# Patient Record
Sex: FEMALE | URBAN - METROPOLITAN AREA
[De-identification: names, ages, dates, MRNs, and addresses within clinical notes are randomized per-mention and may not be internally consistent; named-entity substitution may affect disease eponyms.]

---

## 2017-08-07 ENCOUNTER — INPATIENT (INPATIENT)
Facility: HOSPITAL | Age: 82
LOS: 3 days | Discharge: HOME | End: 2017-08-11
Attending: HOSPITALIST | Admitting: HOSPITALIST
Payer: MEDICARE

## 2017-08-07 VITALS
HEART RATE: 76 BPM | DIASTOLIC BLOOD PRESSURE: 68 MMHG | OXYGEN SATURATION: 100 % | RESPIRATION RATE: 20 BRPM | SYSTOLIC BLOOD PRESSURE: 164 MMHG | TEMPERATURE: 99 F | WEIGHT: 229.28 LBS

## 2017-08-07 DIAGNOSIS — I10 ESSENTIAL (PRIMARY) HYPERTENSION: ICD-10-CM

## 2017-08-07 DIAGNOSIS — R06.02 SHORTNESS OF BREATH: ICD-10-CM

## 2017-08-07 DIAGNOSIS — J47.1 BRONCHIECTASIS WITH (ACUTE) EXACERBATION: ICD-10-CM

## 2017-08-07 LAB
ALBUMIN SERPL ELPH-MCNC: 3.7 G/DL — SIGNIFICANT CHANGE UP (ref 3.3–5)
ALP SERPL-CCNC: 120 U/L — SIGNIFICANT CHANGE UP (ref 40–120)
ALT FLD-CCNC: 23 U/L — SIGNIFICANT CHANGE UP (ref 12–78)
ANION GAP SERPL CALC-SCNC: 10 MMOL/L — SIGNIFICANT CHANGE UP (ref 5–17)
ANION GAP SERPL CALC-SCNC: 8 MMOL/L — SIGNIFICANT CHANGE UP (ref 5–17)
APTT BLD: 29.3 SEC — SIGNIFICANT CHANGE UP (ref 27.5–37.4)
AST SERPL-CCNC: 15 U/L — SIGNIFICANT CHANGE UP (ref 15–37)
BASOPHILS # BLD AUTO: 0.1 K/UL — SIGNIFICANT CHANGE UP (ref 0–0.2)
BASOPHILS NFR BLD AUTO: 1.2 % — SIGNIFICANT CHANGE UP (ref 0–2)
BILIRUB SERPL-MCNC: 0.3 MG/DL — SIGNIFICANT CHANGE UP (ref 0.2–1.2)
BUN SERPL-MCNC: 32 MG/DL — HIGH (ref 7–23)
BUN SERPL-MCNC: 34 MG/DL — HIGH (ref 7–23)
CALCIUM SERPL-MCNC: 9.3 MG/DL — SIGNIFICANT CHANGE UP (ref 8.5–10.1)
CALCIUM SERPL-MCNC: 9.9 MG/DL — SIGNIFICANT CHANGE UP (ref 8.5–10.1)
CHLORIDE SERPL-SCNC: 102 MMOL/L — SIGNIFICANT CHANGE UP (ref 96–108)
CHLORIDE SERPL-SCNC: 105 MMOL/L — SIGNIFICANT CHANGE UP (ref 96–108)
CK MB BLD-MCNC: 3.2 % — SIGNIFICANT CHANGE UP (ref 0–3.5)
CK MB CFR SERPL CALC: 1 NG/ML — SIGNIFICANT CHANGE UP (ref 0.5–3.6)
CK SERPL-CCNC: 31 U/L — SIGNIFICANT CHANGE UP (ref 26–192)
CO2 SERPL-SCNC: 26 MMOL/L — SIGNIFICANT CHANGE UP (ref 22–31)
CO2 SERPL-SCNC: 28 MMOL/L — SIGNIFICANT CHANGE UP (ref 22–31)
CREAT SERPL-MCNC: 1.18 MG/DL — SIGNIFICANT CHANGE UP (ref 0.5–1.3)
CREAT SERPL-MCNC: 1.21 MG/DL — SIGNIFICANT CHANGE UP (ref 0.5–1.3)
EOSINOPHIL # BLD AUTO: 0.1 K/UL — SIGNIFICANT CHANGE UP (ref 0–0.5)
EOSINOPHIL NFR BLD AUTO: 2 % — SIGNIFICANT CHANGE UP (ref 0–6)
GLUCOSE SERPL-MCNC: 118 MG/DL — HIGH (ref 70–99)
GLUCOSE SERPL-MCNC: 187 MG/DL — HIGH (ref 70–99)
HCT VFR BLD CALC: 38.2 % — SIGNIFICANT CHANGE UP (ref 34.5–45)
HGB BLD-MCNC: 12 G/DL — SIGNIFICANT CHANGE UP (ref 11.5–15.5)
INR BLD: 1.09 RATIO — SIGNIFICANT CHANGE UP (ref 0.88–1.16)
LACTATE SERPL-SCNC: 0.9 MMOL/L — SIGNIFICANT CHANGE UP (ref 0.7–2)
LYMPHOCYTES # BLD AUTO: 1.7 K/UL — SIGNIFICANT CHANGE UP (ref 1–3.3)
LYMPHOCYTES # BLD AUTO: 27.3 % — SIGNIFICANT CHANGE UP (ref 13–44)
MCHC RBC-ENTMCNC: 27.7 PG — SIGNIFICANT CHANGE UP (ref 27–34)
MCHC RBC-ENTMCNC: 31.4 GM/DL — LOW (ref 32–36)
MCV RBC AUTO: 88 FL — SIGNIFICANT CHANGE UP (ref 80–100)
MONOCYTES # BLD AUTO: 0.8 K/UL — SIGNIFICANT CHANGE UP (ref 0–0.9)
MONOCYTES NFR BLD AUTO: 12.9 % — SIGNIFICANT CHANGE UP (ref 2–14)
NEUTROPHILS # BLD AUTO: 3.5 K/UL — SIGNIFICANT CHANGE UP (ref 1.8–7.4)
NEUTROPHILS NFR BLD AUTO: 56.6 % — SIGNIFICANT CHANGE UP (ref 43–77)
NT-PROBNP SERPL-SCNC: 111 PG/ML — SIGNIFICANT CHANGE UP (ref 0–450)
PLATELET # BLD AUTO: 224 K/UL — SIGNIFICANT CHANGE UP (ref 150–400)
POTASSIUM SERPL-MCNC: 2.9 MMOL/L — CRITICAL LOW (ref 3.5–5.3)
POTASSIUM SERPL-MCNC: 3.3 MMOL/L — LOW (ref 3.5–5.3)
POTASSIUM SERPL-SCNC: 2.9 MMOL/L — CRITICAL LOW (ref 3.5–5.3)
POTASSIUM SERPL-SCNC: 3.3 MMOL/L — LOW (ref 3.5–5.3)
PROT SERPL-MCNC: 7.9 GM/DL — SIGNIFICANT CHANGE UP (ref 6–8.3)
PROTHROM AB SERPL-ACNC: 11.9 SEC — SIGNIFICANT CHANGE UP (ref 9.8–12.7)
RBC # BLD: 4.34 M/UL — SIGNIFICANT CHANGE UP (ref 3.8–5.2)
RBC # FLD: 13.3 % — SIGNIFICANT CHANGE UP (ref 11–15)
SODIUM SERPL-SCNC: 138 MMOL/L — SIGNIFICANT CHANGE UP (ref 135–145)
SODIUM SERPL-SCNC: 141 MMOL/L — SIGNIFICANT CHANGE UP (ref 135–145)
TROPONIN I SERPL-MCNC: <.015 NG/ML — SIGNIFICANT CHANGE UP (ref 0.01–0.04)
WBC # BLD: 6.1 K/UL — SIGNIFICANT CHANGE UP (ref 3.8–10.5)
WBC # FLD AUTO: 6.1 K/UL — SIGNIFICANT CHANGE UP (ref 3.8–10.5)

## 2017-08-07 PROCEDURE — 71250 CT THORAX DX C-: CPT | Mod: 26

## 2017-08-07 PROCEDURE — 99285 EMERGENCY DEPT VISIT HI MDM: CPT

## 2017-08-07 PROCEDURE — 93010 ELECTROCARDIOGRAM REPORT: CPT

## 2017-08-07 PROCEDURE — 99223 1ST HOSP IP/OBS HIGH 75: CPT

## 2017-08-07 PROCEDURE — 71010: CPT | Mod: 26

## 2017-08-07 RX ORDER — LORATADINE 10 MG/1
10 TABLET ORAL DAILY
Qty: 0 | Refills: 0 | Status: DISCONTINUED | OUTPATIENT
Start: 2017-08-07 | End: 2017-08-11

## 2017-08-07 RX ORDER — IPRATROPIUM/ALBUTEROL SULFATE 18-103MCG
3 AEROSOL WITH ADAPTER (GRAM) INHALATION
Qty: 0 | Refills: 0 | Status: COMPLETED | OUTPATIENT
Start: 2017-08-07 | End: 2017-08-07

## 2017-08-07 RX ORDER — SODIUM CHLORIDE 9 MG/ML
1000 INJECTION INTRAMUSCULAR; INTRAVENOUS; SUBCUTANEOUS ONCE
Qty: 0 | Refills: 0 | Status: COMPLETED | OUTPATIENT
Start: 2017-08-07 | End: 2017-08-07

## 2017-08-07 RX ORDER — AMLODIPINE BESYLATE 2.5 MG/1
5 TABLET ORAL DAILY
Qty: 0 | Refills: 0 | Status: DISCONTINUED | OUTPATIENT
Start: 2017-08-07 | End: 2017-08-11

## 2017-08-07 RX ORDER — HYDROCHLOROTHIAZIDE 25 MG
25 TABLET ORAL DAILY
Qty: 0 | Refills: 0 | Status: DISCONTINUED | OUTPATIENT
Start: 2017-08-07 | End: 2017-08-09

## 2017-08-07 RX ORDER — SODIUM CHLORIDE 9 MG/ML
3 INJECTION INTRAMUSCULAR; INTRAVENOUS; SUBCUTANEOUS ONCE
Qty: 0 | Refills: 0 | Status: COMPLETED | OUTPATIENT
Start: 2017-08-07 | End: 2017-08-07

## 2017-08-07 RX ORDER — HEPARIN SODIUM 5000 [USP'U]/ML
5000 INJECTION INTRAVENOUS; SUBCUTANEOUS EVERY 8 HOURS
Qty: 0 | Refills: 0 | Status: DISCONTINUED | OUTPATIENT
Start: 2017-08-07 | End: 2017-08-11

## 2017-08-07 RX ORDER — PANTOPRAZOLE SODIUM 20 MG/1
40 TABLET, DELAYED RELEASE ORAL
Qty: 0 | Refills: 0 | Status: DISCONTINUED | OUTPATIENT
Start: 2017-08-07 | End: 2017-08-11

## 2017-08-07 RX ORDER — POTASSIUM CHLORIDE 20 MEQ
10 PACKET (EA) ORAL ONCE
Qty: 0 | Refills: 0 | Status: COMPLETED | OUTPATIENT
Start: 2017-08-07 | End: 2017-08-07

## 2017-08-07 RX ORDER — IPRATROPIUM/ALBUTEROL SULFATE 18-103MCG
3 AEROSOL WITH ADAPTER (GRAM) INHALATION EVERY 6 HOURS
Qty: 0 | Refills: 0 | Status: DISCONTINUED | OUTPATIENT
Start: 2017-08-07 | End: 2017-08-11

## 2017-08-07 RX ORDER — POTASSIUM CHLORIDE 20 MEQ
40 PACKET (EA) ORAL ONCE
Qty: 0 | Refills: 0 | Status: COMPLETED | OUTPATIENT
Start: 2017-08-07 | End: 2017-08-07

## 2017-08-07 RX ORDER — SERTRALINE 25 MG/1
25 TABLET, FILM COATED ORAL DAILY
Qty: 0 | Refills: 0 | Status: DISCONTINUED | OUTPATIENT
Start: 2017-08-07 | End: 2017-08-11

## 2017-08-07 RX ORDER — SIMVASTATIN 20 MG/1
20 TABLET, FILM COATED ORAL AT BEDTIME
Qty: 0 | Refills: 0 | Status: DISCONTINUED | OUTPATIENT
Start: 2017-08-07 | End: 2017-08-11

## 2017-08-07 RX ADMIN — Medication 3 MILLILITER(S): at 23:44

## 2017-08-07 RX ADMIN — SODIUM CHLORIDE 1000 MILLILITER(S): 9 INJECTION INTRAMUSCULAR; INTRAVENOUS; SUBCUTANEOUS at 07:12

## 2017-08-07 RX ADMIN — Medication 3 MILLILITER(S): at 06:29

## 2017-08-07 RX ADMIN — HEPARIN SODIUM 5000 UNIT(S): 5000 INJECTION INTRAVENOUS; SUBCUTANEOUS at 22:30

## 2017-08-07 RX ADMIN — Medication 100 MILLIEQUIVALENT(S): at 11:17

## 2017-08-07 RX ADMIN — Medication 40 MILLIEQUIVALENT(S): at 12:32

## 2017-08-07 RX ADMIN — SIMVASTATIN 20 MILLIGRAM(S): 20 TABLET, FILM COATED ORAL at 22:30

## 2017-08-07 RX ADMIN — Medication 3 MILLILITER(S): at 07:48

## 2017-08-07 RX ADMIN — Medication 3 MILLILITER(S): at 07:05

## 2017-08-07 RX ADMIN — Medication 40 MILLIGRAM(S): at 22:30

## 2017-08-07 RX ADMIN — SODIUM CHLORIDE 3 MILLILITER(S): 9 INJECTION INTRAMUSCULAR; INTRAVENOUS; SUBCUTANEOUS at 06:25

## 2017-08-07 RX ADMIN — Medication 125 MILLIGRAM(S): at 07:07

## 2017-08-07 NOTE — ED ADULT NURSE REASSESSMENT NOTE - NS ED NURSE REASSESS COMMENT FT1
Pt reports improvement in respiratory status. No apparent respiratory distress noted at this time. Family at bedside awaiting transfer to bed assigned

## 2017-08-07 NOTE — ED ADULT NURSE NOTE - OBJECTIVE STATEMENT
cough for 2 days with yellowish Phlegm,denies fever,no shortness of breath,denies chest nor back pain

## 2017-08-07 NOTE — ED PROVIDER NOTE - OBJECTIVE STATEMENT
89yoF; with pmh signif for HTN; now p/w cough and sob x3-4 days.  cough--yellow productive cough. associated with sob. denies cp/palp. denies f/c/s. denies sick contacts. denies travel. denies abd pain. denies n/v/d. denies dysuria, hematuria, frequency, urgency.

## 2017-08-07 NOTE — H&P ADULT - NSHPPHYSICALEXAM_GEN_ALL_CORE
GENERAL: NAD well-developed  HEAD:  Atraumatic, Normocephalic  EYES: EOMI, PERRLA, conjunctiva and sclera clear  ENMT: No tonsillar erythema, exudates, or enlargement; Moist mucous membranes, Good dentition, No lesions  NECK: Supple, No JVD, Normal thyroid  NERVOUS SYSTEM:  Alert & Oriented X3, Good concentration; Motor Strength 5/5 B/L upper and lower extremities; DTRs 2+ intact and symmetric  CHEST/LUNG: mild expiratory wheezing   HEART: Regular rate and rhythm; No murmurs, rubs, or gallops  ABDOMEN: Soft, Nontender, Nondistended; Bowel sounds present  EXTREMITIES:  2+ Peripheral Pulses, No clubbing, cyanosis, or edema  LYMPH: No lymphadenopathy   SKIN: No rashes or lesions

## 2017-08-07 NOTE — ED PROVIDER NOTE - PHYSICAL EXAMINATION
General:     NAD, well-nourished, well-appearing  Head:     NC/AT, EOMI, oral mucosa moist  Neck:     trachea midline  Lungs:  bibasilar crackles diffusely, exp wheezing, no retractions  CVS:     S1S2, RRR, no m/g/r  Abd:     +BS, s/nt/nd, no organomegaly  Ext:    2+ radial and pedal pulses, no c/c/e  Neuro: AAOx3, no sensory/motor deficits

## 2017-08-07 NOTE — H&P ADULT - NSHPLABSRESULTS_GEN_ALL_CORE
< from: Xray Chest 1 View AP/PA. (08.07.17 @ 06:32) >    INTERPRETATION:  AP erect chest on August 7 at 6:26 AM. Patient has   respiratory distress and cough.    Heart is normal for projection.    Spinal catheter again noted ending in the mid lower thoracic region.    The lung fields and pleural surfaces are unremarkable.    The chest is similar to December 14, 2014.    IMPRESSION: Spinal catheter. No infiltrate.          6.1   )-----------( 224      ( 07 Aug 2017 06:22 )             38.2   08-07    141  |  105  |  32<H>  ----------------------------<  187<H>  2.9<LL>   |  26  |  1.18    Ca    9.3      07 Aug 2017 09:24    TPro  7.9  /  Alb  3.7  /  TBili  0.3  /  DBili  x   /  AST  15  /  ALT  23  /  AlkPhos  120  08-07    < from: CT Chest No Cont (08.07.17 @ 13:18) >    XAM:  CT CHEST                            PROCEDURE DATE:  08/07/2017          INTERPRETATION:  CLINICAL INFORMATION: Coughing, shortness of breath    COMPARISON: No prior CT studies are available for comparison.    PROCEDURE:     Serial axial sections through the chest were obtained without   administration of nonionic intravenous contrast. Sagittal, MIP and   coronal reformats were then generated from the axial images.    FINDINGS:     LUNG AND LARGE AIRWAYS: The tracheobronchial tree is patent. There is   slightly increased peripheral reticular opacities as well as mild   bronchiectasis. There are a few scattered peripheral ground glass   opacities. There are a few scattered granulomas as well as additional   sub-4 mm nodules which are nonspecific.  PLEURA: No pleural effusion.  VESSELS: Marked atherosclerotic changes of the aorta and branching vessels  HEART: Normal size. No pericardial effusion.  MEDIASTINUM AND MARY: Within normal limits.  CHEST WALL AND LOWER NECK: The left thyroid lobe is enlarged and   heterogeneous and extends into the upper mediastinum.    UPPER ABDOMEN: Mild nonspecific left adrenal gland thickening.    BONES: Degenerative changes of the spine. Epidural catheter is seen   terminating level of T7.     IMPRESSION:     No focal consolidation or pleural effusion. Chronic lung changes with   mild bronchiectasis.     Scattered sub-4mm nodules are nonspecific.    Additional findings as above.

## 2017-08-07 NOTE — H&P ADULT - NSHPREVIEWOFSYSTEMS_GEN_ALL_CORE
CONSTITUTIONAL: No fever, weight loss, or fatigue  EYES: No eye pain, visual disturbances, or discharge  ENMT:  No difficulty hearing, tinnitus, vertigo; No sinus or throat pain  NECK: No pain or stiffness  RESPIRATORY: pos  cough, no wheezing, chills or hemoptysis; POSITIVE  shortness of breath  CARDIOVASCULAR: No chest pain, palpitations, dizziness, or leg swelling  GASTROINTESTINAL: No abdominal or epigastric pain. No nausea, vomiting, or hematemesis; No diarrhea or constipation. No melena or hematochezia.  GENITOURINARY: No dysuria, frequency, hematuria, or incontinence  NEUROLOGICAL: No headaches, memory loss, loss of strength, numbness, or tremors  SKIN: No itching, burning, rashes, or lesions   LYMPH NODES: No enlarged glands  ENDOCRINE: No heat or cold intolerance; No hair loss  MUSCULOSKELETAL: No joint pain or swelling; No muscle, back, or extremity pain  PSYCHIATRIC: No depression, anxiety, mood swings, or difficulty sleeping  HEME/LYMPH: No easy bruising, or bleeding gums  ALLERGY AND IMMUNOLOGIC: No hives or eczema

## 2017-08-08 LAB
ANION GAP SERPL CALC-SCNC: 10 MMOL/L — SIGNIFICANT CHANGE UP (ref 5–17)
BUN SERPL-MCNC: 26 MG/DL — HIGH (ref 7–23)
CALCIUM SERPL-MCNC: 10.1 MG/DL — SIGNIFICANT CHANGE UP (ref 8.5–10.1)
CHLORIDE SERPL-SCNC: 104 MMOL/L — SIGNIFICANT CHANGE UP (ref 96–108)
CO2 SERPL-SCNC: 25 MMOL/L — SIGNIFICANT CHANGE UP (ref 22–31)
CREAT SERPL-MCNC: 1.35 MG/DL — HIGH (ref 0.5–1.3)
GLUCOSE SERPL-MCNC: 194 MG/DL — HIGH (ref 70–99)
HCT VFR BLD CALC: 37.3 % — SIGNIFICANT CHANGE UP (ref 34.5–45)
HGB BLD-MCNC: 12.1 G/DL — SIGNIFICANT CHANGE UP (ref 11.5–15.5)
MCHC RBC-ENTMCNC: 28.9 PG — SIGNIFICANT CHANGE UP (ref 27–34)
MCHC RBC-ENTMCNC: 32.5 GM/DL — SIGNIFICANT CHANGE UP (ref 32–36)
MCV RBC AUTO: 88.7 FL — SIGNIFICANT CHANGE UP (ref 80–100)
PLATELET # BLD AUTO: 213 K/UL — SIGNIFICANT CHANGE UP (ref 150–400)
POTASSIUM SERPL-MCNC: 3.7 MMOL/L — SIGNIFICANT CHANGE UP (ref 3.5–5.3)
POTASSIUM SERPL-SCNC: 3.7 MMOL/L — SIGNIFICANT CHANGE UP (ref 3.5–5.3)
RBC # BLD: 4.21 M/UL — SIGNIFICANT CHANGE UP (ref 3.8–5.2)
RBC # FLD: 13.5 % — SIGNIFICANT CHANGE UP (ref 11–15)
SODIUM SERPL-SCNC: 139 MMOL/L — SIGNIFICANT CHANGE UP (ref 135–145)
WBC # BLD: 10.8 K/UL — HIGH (ref 3.8–10.5)
WBC # FLD AUTO: 10.8 K/UL — HIGH (ref 3.8–10.5)

## 2017-08-08 PROCEDURE — 99233 SBSQ HOSP IP/OBS HIGH 50: CPT

## 2017-08-08 RX ORDER — DOCUSATE SODIUM 100 MG
100 CAPSULE ORAL DAILY
Qty: 0 | Refills: 0 | Status: DISCONTINUED | OUTPATIENT
Start: 2017-08-08 | End: 2017-08-11

## 2017-08-08 RX ORDER — AZITHROMYCIN 500 MG/1
500 TABLET, FILM COATED ORAL DAILY
Qty: 0 | Refills: 0 | Status: DISCONTINUED | OUTPATIENT
Start: 2017-08-08 | End: 2017-08-11

## 2017-08-08 RX ADMIN — Medication 3 MILLILITER(S): at 11:31

## 2017-08-08 RX ADMIN — Medication 3 MILLILITER(S): at 23:52

## 2017-08-08 RX ADMIN — Medication 100 MILLIGRAM(S): at 15:23

## 2017-08-08 RX ADMIN — HEPARIN SODIUM 5000 UNIT(S): 5000 INJECTION INTRAVENOUS; SUBCUTANEOUS at 05:36

## 2017-08-08 RX ADMIN — SERTRALINE 25 MILLIGRAM(S): 25 TABLET, FILM COATED ORAL at 11:53

## 2017-08-08 RX ADMIN — SIMVASTATIN 20 MILLIGRAM(S): 20 TABLET, FILM COATED ORAL at 22:42

## 2017-08-08 RX ADMIN — Medication 3 MILLILITER(S): at 18:38

## 2017-08-08 RX ADMIN — HEPARIN SODIUM 5000 UNIT(S): 5000 INJECTION INTRAVENOUS; SUBCUTANEOUS at 13:34

## 2017-08-08 RX ADMIN — AMLODIPINE BESYLATE 5 MILLIGRAM(S): 2.5 TABLET ORAL at 05:35

## 2017-08-08 RX ADMIN — Medication 40 MILLIGRAM(S): at 05:35

## 2017-08-08 RX ADMIN — Medication 40 MILLIGRAM(S): at 22:45

## 2017-08-08 RX ADMIN — Medication 3 MILLILITER(S): at 05:42

## 2017-08-08 RX ADMIN — PANTOPRAZOLE SODIUM 40 MILLIGRAM(S): 20 TABLET, DELAYED RELEASE ORAL at 07:39

## 2017-08-08 RX ADMIN — Medication 40 MILLIGRAM(S): at 13:34

## 2017-08-08 RX ADMIN — Medication 25 MILLIGRAM(S): at 05:36

## 2017-08-08 RX ADMIN — LORATADINE 10 MILLIGRAM(S): 10 TABLET ORAL at 11:53

## 2017-08-08 RX ADMIN — HEPARIN SODIUM 5000 UNIT(S): 5000 INJECTION INTRAVENOUS; SUBCUTANEOUS at 22:49

## 2017-08-08 NOTE — PROGRESS NOTE ADULT - SUBJECTIVE AND OBJECTIVE BOX
Patient is a 89y old  Female who presents with a chief complaint of short of breath (07 Aug 2017 17:08)      INTERVAL HPI/OVERNIGHT EVENTS: New admission overnight     MEDICATIONS  (STANDING):  heparin  Injectable 5000 Unit(s) SubCutaneous every 8 hours  sertraline 25 milliGRAM(s) Oral daily  simvastatin 20 milliGRAM(s) Oral at bedtime  amLODIPine   Tablet 5 milliGRAM(s) Oral daily  hydrochlorothiazide 25 milliGRAM(s) Oral daily  pantoprazole    Tablet 40 milliGRAM(s) Oral before breakfast  ALBUTerol/ipratropium for Nebulization 3 milliLiter(s) Nebulizer every 6 hours  methylPREDNISolone sodium succinate Injectable 40 milliGRAM(s) IV Push every 8 hours  loratadine 10 milliGRAM(s) Oral daily  docusate sodium 100 milliGRAM(s) Oral daily  azithromycin   Tablet 500 milliGRAM(s) Oral daily    MEDICATIONS  (PRN):  benzonatate 100 milliGRAM(s) Oral three times a day PRN Cough      Allergies    No Known Allergies    Intolerances        REVIEW OF SYSTEMS:  CONSTITUTIONAL: No fever, weight loss, or fatigue  EYES: No eye pain, visual disturbances, or discharge  ENMT:  No difficulty hearing, tinnitus, vertigo; No sinus or throat pain  NECK: No pain or stiffness  BREASTS: No pain, masses, or nipple discharge  RESPIRATORY: wheezing cough   CV: No chest pain, palpitations, dizziness, or leg swelling  GASTROINTESTINAL: No abdominal or epigastric pain. No nausea, vomiting, or hematemesis; No diarrhea or constipation. No melena or hematochezia.  GENITOURINARY: No dysuria, frequency, hematuria, or incontinence  NEUROLOGICAL: No headaches, memory loss, loss of strength, numbness, or tremors  SKIN: No itching, burning, rashes, or lesions   LYMPH NODES: No enlarged glands  ENDOCRINE: No heat or cold intolerance; No hair loss  MUSCULOSKELETAL: No joint pain or swelling; No muscle, back, or extremity pain  PSYCHIATRIC: No depression, anxiety, mood swings, or difficulty sleeping  HEME/LYMPH: No easy bruising, or bleeding gums  ALLERGY AND IMMUNOLOGIC: No hives or eczema    Vital Signs Last 24 Hrs  T(C): 36.5 (08 Aug 2017 10:45), Max: 37.4 (08 Aug 2017 05:33)  T(F): 97.7 (08 Aug 2017 10:45), Max: 99.4 (08 Aug 2017 05:33)  HR: 75 (08 Aug 2017 12:07) (75 - 96)  BP: 144/75 (08 Aug 2017 10:45) (144/75 - 153/76)  BP(mean): --  RR: 16 (08 Aug 2017 10:45) (16 - 17)  SpO2: 96% (08 Aug 2017 12:07) (93% - 97%)    PHYSICAL EXAM:  GENERAL: NAD, well-groomed, well-developed  HEAD:  Atraumatic, Normocephalic  EYES: EOMI, PERRLA, conjunctiva and sclera clear  ENMT: No tonsillar erythema, exudates, or enlargement; Moist mucous membranes, Good dentition, No lesions  NECK: Supple, No JVD, Normal thyroid  NERVOUS SYSTEM:  Alert & Oriented X3, Good concentration; Motor Strength 5/5 B/L upper and lower extremities; DTRs 2+ intact and symmetric  CHEST/LUNG: lright sided bronchial wheeze   HEART: Regular rate and rhythm; No murmurs, rubs, or gallops  ABDOMEN: Soft, Nontender, Nondistended; Bowel sounds present  EXTREMITIES:  2+ Peripheral Pulses, No clubbing, cyanosis, or edema  LYMPH: No lymphadenopathy noted  SKIN: No rashes or lesions    LABS:                        12.1   10.8  )-----------( 213      ( 08 Aug 2017 09:55 )             37.3     08-08    139  |  104  |  26<H>  ----------------------------<  194<H>  3.7   |  25  |  1.35<H>    Ca    10.1      08 Aug 2017 09:55    TPro  7.9  /  Alb  3.7  /  TBili  0.3  /  DBili  x   /  AST  15  /  ALT  23  /  AlkPhos  120  08-07    PT/INR - ( 07 Aug 2017 06:22 )   PT: 11.9 sec;   INR: 1.09 ratio         PTT - ( 07 Aug 2017 06:22 )  PTT:29.3 sec    CAPILLARY BLOOD GLUCOSE          RADIOLOGY & ADDITIONAL TESTS:    Imaging Personally Reviewed:  [X ] YES  [ ] NO    Consultant(s) Notes Reviewed:  [X ] YES  [ ] NO    Care Discussed with Consultants/Other Providers [X ] YES  [ ] NO

## 2017-08-09 DIAGNOSIS — N17.9 ACUTE KIDNEY FAILURE, UNSPECIFIED: ICD-10-CM

## 2017-08-09 LAB
ALBUMIN SERPL ELPH-MCNC: 3.3 G/DL — SIGNIFICANT CHANGE UP (ref 3.3–5)
ALP SERPL-CCNC: 103 U/L — SIGNIFICANT CHANGE UP (ref 40–120)
ALT FLD-CCNC: 24 U/L — SIGNIFICANT CHANGE UP (ref 12–78)
ANION GAP SERPL CALC-SCNC: 12 MMOL/L — SIGNIFICANT CHANGE UP (ref 5–17)
AST SERPL-CCNC: 15 U/L — SIGNIFICANT CHANGE UP (ref 15–37)
BILIRUB SERPL-MCNC: 0.3 MG/DL — SIGNIFICANT CHANGE UP (ref 0.2–1.2)
BUN SERPL-MCNC: 38 MG/DL — HIGH (ref 7–23)
CALCIUM SERPL-MCNC: 10 MG/DL — SIGNIFICANT CHANGE UP (ref 8.5–10.1)
CHLORIDE SERPL-SCNC: 102 MMOL/L — SIGNIFICANT CHANGE UP (ref 96–108)
CO2 SERPL-SCNC: 25 MMOL/L — SIGNIFICANT CHANGE UP (ref 22–31)
CREAT SERPL-MCNC: 1.64 MG/DL — HIGH (ref 0.5–1.3)
GLUCOSE SERPL-MCNC: 145 MG/DL — HIGH (ref 70–99)
HCT VFR BLD CALC: 36.2 % — SIGNIFICANT CHANGE UP (ref 34.5–45)
HGB BLD-MCNC: 11.7 G/DL — SIGNIFICANT CHANGE UP (ref 11.5–15.5)
MAGNESIUM SERPL-MCNC: 1.7 MG/DL — SIGNIFICANT CHANGE UP (ref 1.6–2.6)
MCHC RBC-ENTMCNC: 28 PG — SIGNIFICANT CHANGE UP (ref 27–34)
MCHC RBC-ENTMCNC: 32.3 GM/DL — SIGNIFICANT CHANGE UP (ref 32–36)
MCV RBC AUTO: 86.5 FL — SIGNIFICANT CHANGE UP (ref 80–100)
PLATELET # BLD AUTO: 240 K/UL — SIGNIFICANT CHANGE UP (ref 150–400)
POTASSIUM SERPL-MCNC: 3.6 MMOL/L — SIGNIFICANT CHANGE UP (ref 3.5–5.3)
POTASSIUM SERPL-SCNC: 3.6 MMOL/L — SIGNIFICANT CHANGE UP (ref 3.5–5.3)
PROCALCITONIN SERPL-MCNC: <0.05 NG/ML — SIGNIFICANT CHANGE UP (ref 0–0.04)
PROT SERPL-MCNC: 7.2 GM/DL — SIGNIFICANT CHANGE UP (ref 6–8.3)
RBC # BLD: 4.18 M/UL — SIGNIFICANT CHANGE UP (ref 3.8–5.2)
RBC # FLD: 13.3 % — SIGNIFICANT CHANGE UP (ref 11–15)
SODIUM SERPL-SCNC: 139 MMOL/L — SIGNIFICANT CHANGE UP (ref 135–145)
WBC # BLD: 14.5 K/UL — HIGH (ref 3.8–10.5)
WBC # FLD AUTO: 14.5 K/UL — HIGH (ref 3.8–10.5)

## 2017-08-09 PROCEDURE — 99233 SBSQ HOSP IP/OBS HIGH 50: CPT

## 2017-08-09 RX ORDER — SERTRALINE 25 MG/1
1 TABLET, FILM COATED ORAL
Qty: 0 | Refills: 0 | COMMUNITY

## 2017-08-09 RX ORDER — POTASSIUM CHLORIDE 20 MEQ
1 PACKET (EA) ORAL
Qty: 0 | Refills: 0 | COMMUNITY

## 2017-08-09 RX ORDER — AMLODIPINE BESYLATE 2.5 MG/1
1 TABLET ORAL
Qty: 0 | Refills: 0 | COMMUNITY

## 2017-08-09 RX ORDER — OMEPRAZOLE 10 MG/1
1 CAPSULE, DELAYED RELEASE ORAL
Qty: 0 | Refills: 0 | COMMUNITY

## 2017-08-09 RX ORDER — SIMVASTATIN 20 MG/1
1 TABLET, FILM COATED ORAL
Qty: 0 | Refills: 0 | COMMUNITY

## 2017-08-09 RX ADMIN — Medication 3 MILLILITER(S): at 23:12

## 2017-08-09 RX ADMIN — Medication 40 MILLIGRAM(S): at 22:11

## 2017-08-09 RX ADMIN — HEPARIN SODIUM 5000 UNIT(S): 5000 INJECTION INTRAVENOUS; SUBCUTANEOUS at 05:36

## 2017-08-09 RX ADMIN — Medication 40 MILLIGRAM(S): at 13:33

## 2017-08-09 RX ADMIN — HEPARIN SODIUM 5000 UNIT(S): 5000 INJECTION INTRAVENOUS; SUBCUTANEOUS at 22:12

## 2017-08-09 RX ADMIN — HEPARIN SODIUM 5000 UNIT(S): 5000 INJECTION INTRAVENOUS; SUBCUTANEOUS at 13:36

## 2017-08-09 RX ADMIN — AMLODIPINE BESYLATE 5 MILLIGRAM(S): 2.5 TABLET ORAL at 05:41

## 2017-08-09 RX ADMIN — Medication 3 MILLILITER(S): at 12:22

## 2017-08-09 RX ADMIN — LORATADINE 10 MILLIGRAM(S): 10 TABLET ORAL at 12:53

## 2017-08-09 RX ADMIN — Medication 3 MILLILITER(S): at 17:34

## 2017-08-09 RX ADMIN — Medication 3 MILLILITER(S): at 05:15

## 2017-08-09 RX ADMIN — SIMVASTATIN 20 MILLIGRAM(S): 20 TABLET, FILM COATED ORAL at 22:12

## 2017-08-09 RX ADMIN — PANTOPRAZOLE SODIUM 40 MILLIGRAM(S): 20 TABLET, DELAYED RELEASE ORAL at 07:50

## 2017-08-09 RX ADMIN — Medication 40 MILLIGRAM(S): at 05:36

## 2017-08-09 RX ADMIN — Medication 10 MILLIGRAM(S): at 02:43

## 2017-08-09 RX ADMIN — AZITHROMYCIN 500 MILLIGRAM(S): 500 TABLET, FILM COATED ORAL at 12:44

## 2017-08-09 RX ADMIN — Medication 100 MILLIGRAM(S): at 12:44

## 2017-08-09 RX ADMIN — SERTRALINE 25 MILLIGRAM(S): 25 TABLET, FILM COATED ORAL at 12:44

## 2017-08-09 RX ADMIN — Medication 25 MILLIGRAM(S): at 05:40

## 2017-08-09 NOTE — PHYSICAL THERAPY INITIAL EVALUATION ADULT - PERTINENT HX OF CURRENT PROBLEM, REHAB EVAL
Pt is a 89yo F admitted with SOB x 3-4 days & productive cough. CT chest: no focal consolidation or pleural effusion, chronic lung changes with mild bronchiectasis & scattered sub-4mm nodules (non-specific). X-ray chest: spinal catheter, no infiltrate. Pulmonary consult: pending.

## 2017-08-09 NOTE — PROGRESS NOTE ADULT - PROBLEM SELECTOR PLAN 1
nebs/steroids  will add azithromycin no obvious consolidation   tessalon for cough pulmonary consult

## 2017-08-09 NOTE — PROGRESS NOTE ADULT - SUBJECTIVE AND OBJECTIVE BOX
Patient is a 89y old  Female who presents with a chief complaint of short of breath (07 Aug 2017 17:08)      INTERVAL HPI/OVERNIGHT EVENTS: no acute events still with cough but feels ok     MEDICATIONS  (STANDING):  heparin  Injectable 5000 Unit(s) SubCutaneous every 8 hours  sertraline 25 milliGRAM(s) Oral daily  simvastatin 20 milliGRAM(s) Oral at bedtime  amLODIPine   Tablet 5 milliGRAM(s) Oral daily  hydrochlorothiazide 25 milliGRAM(s) Oral daily  pantoprazole    Tablet 40 milliGRAM(s) Oral before breakfast  ALBUTerol/ipratropium for Nebulization 3 milliLiter(s) Nebulizer every 6 hours  methylPREDNISolone sodium succinate Injectable 40 milliGRAM(s) IV Push every 8 hours  loratadine 10 milliGRAM(s) Oral daily  docusate sodium 100 milliGRAM(s) Oral daily  azithromycin   Tablet 500 milliGRAM(s) Oral daily    MEDICATIONS  (PRN):  benzonatate 100 milliGRAM(s) Oral three times a day PRN Cough      Allergies    No Known Allergies    Intolerances        REVIEW OF SYSTEMS:  CONSTITUTIONAL: No fever, weight loss, or fatigue  EYES: No eye pain, visual disturbances, or discharge  ENMT:  No difficulty hearing, tinnitus, vertigo; No sinus or throat pain  NECK: No pain or stiffness  BREASTS: No pain, masses, or nipple discharge  RESPIRATORY: cough mild shortness of breath   CARDIOVASCULAR: No chest pain, palpitations, dizziness, or leg swelling  GASTROINTESTINAL: No abdominal or epigastric pain. No nausea, vomiting, or hematemesis; No diarrhea or constipation. No melena or hematochezia.  GENITOURINARY: No dysuria, frequency, hematuria, or incontinence  NEUROLOGICAL: No headaches, memory loss, loss of strength, numbness, or tremors  SKIN: No itching, burning, rashes, or lesions   LYMPH NODES: No enlarged glands  ENDOCRINE: No heat or cold intolerance; No hair loss  MUSCULOSKELETAL: No joint pain or swelling; No muscle, back, or extremity pain  PSYCHIATRIC: No depression, anxiety, mood swings, or difficulty sleeping  HEME/LYMPH: No easy bruising, or bleeding gums  ALLERGY AND IMMUNOLOGIC: No hives or eczema    Vital Signs Last 24 Hrs  T(C): 36.6 (09 Aug 2017 10:52), Max: 36.6 (09 Aug 2017 10:52)  T(F): 97.8 (09 Aug 2017 10:52), Max: 97.8 (09 Aug 2017 10:52)  HR: 94 (09 Aug 2017 12:30) (78 - 105)  BP: 143/58 (09 Aug 2017 10:52) (143/58 - 153/77)  BP(mean): --  RR: 16 (09 Aug 2017 10:52) (16 - 17)  SpO2: 98% (09 Aug 2017 12:30) (94% - 98%)    PHYSICAL EXAM:  GENERAL: NAD, well-groomed, well-developed  HEAD:  Atraumatic, Normocephalic  EYES: EOMI, PERRLA, conjunctiva and sclera clear  ENMT: No tonsillar erythema, exudates, or enlargement; Moist mucous membranes, Good dentition, No lesions  NECK: Supple, No JVD, Normal thyroid  NERVOUS SYSTEM:  Alert & Oriented X3, Good concentration; Motor Strength 5/5 B/L upper and lower extremities; DTRs 2+ intact and symmetric  CHEST/LUNG:  rhonchi, wheezing, cough   HEART: Regular rate and rhythm; No murmurs, rubs, or gallops  ABDOMEN: Soft, Nontender, Nondistended; Bowel sounds present  EXTREMITIES:  2+ Peripheral Pulses, No clubbing, cyanosis, or edema  LYMPH: No lymphadenopathy noted  SKIN: No rashes or lesions    LABS:                        11.7   14.5  )-----------( 240      ( 09 Aug 2017 06:53 )             36.2     08-09    139  |  102  |  38<H>  ----------------------------<  145<H>  3.6   |  25  |  1.64<H>    Ca    10.0      09 Aug 2017 06:53  Mg     1.7     08-09    TPro  7.2  /  Alb  3.3  /  TBili  0.3  /  DBili  x   /  AST  15  /  ALT  24  /  AlkPhos  103  08-09        CAPILLARY BLOOD GLUCOSE          RADIOLOGY & ADDITIONAL TESTS:  < from: CT Chest No Cont (08.07.17 @ 13:18) >  MPRESSION:     No focal consolidation or pleural effusion. Chronic lung changes with   mild bronchiectasis.     Scattered sub-4mm nodules are nonspecific.    Additional findings as above.                < end of copied text >    Imaging Personally Reviewed:  [ ] YES  [ ] NO    Consultant(s) Notes Reviewed:  [ ] YES  [ ] NO    Care Discussed with Consultants/Other Providers [ ] YES  [ ] NO

## 2017-08-09 NOTE — PHYSICAL THERAPY INITIAL EVALUATION ADULT - MODIFIED CLINICAL TEST OF SENSORY INTEGRATION IN BALANCE TEST
Barthel Index: Feeding Score _10/10__, Bathing Score _5/5__, Grooming Score _5/5__, Dressing Score _10/10__, Bowels Score __10/10_, Bladder Score _10/10__, Toilet Score _10/10__, Transfers Score _15/15__, Mobility Score __15/15_, Stairs Score _10/10__,     Total Score ___100/100

## 2017-08-09 NOTE — CONSULT NOTE ADULT - SUBJECTIVE AND OBJECTIVE BOX
Patient is a 89y old  Female who presents with a chief complaint of short of breath (07 Aug 2017 17:08)    HPI:  89yoF; with PMH signif for HTN and Pneumonias  now p/w cough and sob x3-4 days.  cough--yellow productive cough. associated with sob. denies cp/palp. denies f/c/s. denies sick contacts. denies travel. denies abd pain. denies n/v/d. denies dysuria, hematuria, frequency, urgency. (07 Aug 2017 17:08).  Admitted with sob and infected bronchiectasis.  Smoked for 30 years, quit 40 years ago.    PAST MEDICAL & SURGICAL HISTORY:  Hypertension  No significant past surgical history    FAMILY HISTORY:  No pertinent family history in first degree relatives    SOCIAL HISTORY: smoked for 30 years, quit 40 years ago.    Allergies  No Known Allergies    MEDICATIONS  (STANDING):  heparin  Injectable 5000 Unit(s) Sub Cutaneous every 8 hours  sertraline 25 milliGRAM(s) Oral daily  simvastatin 20 milliGRAM(s) Oral at bedtime  amLODIPine   Tablet 5 milliGRAM(s) Oral daily  pantoprazole    Tablet 40 milliGRAM(s) Oral before breakfast  ALBUTerol/ipratropium for Nebulization 3 milliLiter(s) Nebulizer every 6 hours  methylPREDNISolone sodium succinate Injectable 40 milliGRAM(s) IV Push every 8 hours  loratadine 10 milliGRAM(s) Oral daily  docusate sodium 100 milliGRAM(s) Oral daily  azithromycin   Tablet 500 milliGRAM(s) Oral daily    MEDICATIONS  (PRN):  benzonatate 100 milliGRAM(s) Oral three times a day PRN Cough    REVIEW OF SYSTEMS:    Constitutional:            No fever, weight loss or fatigue  HEENT:                      No difficulty hearing, tinnitus, vertigo; No sinus or throat pain  Respiratory:               sob and cough.  Cardiovascular:           No chest pain, palpitations  Gastrointestinal:        No abdominal or epigastric pain. No N/V/diarrhea or hematemesis  Genitourinary:            No dysuria, frequency, hematuria or incontinence  SKIN:                             no rash  Musculoskeletal:        No joint pain or swelling  Extremities:                No swelling  Neurological:              No headaches  Psychiatric:                 No depression, anxiety    Vital Signs Last 24 Hrs  T(C): 36.6 (09 Aug 2017 10:52), Max: 36.6 (09 Aug 2017 10:52)  T(F): 97.8 (09 Aug 2017 10:52), Max: 97.8 (09 Aug 2017 10:52)  HR: 94 (09 Aug 2017 12:30) (78 - 105)  BP: 143/58 (09 Aug 2017 10:52) (143/58 - 153/77)  BP(mean): --  RR: 16 (09 Aug 2017 10:52) (16 - 16)  SpO2: 98% (09 Aug 2017 12:30) (94% - 98%)    PHYSICAL EXAM:  GEN:        Awake, responsive and comfortable.  HEENT:    Normal.    RESP:      decreased air entry.  CVS:          Regular rate and rhythm.   ABD:         Soft, non-tender, non-distended;   :             No costovertebral angle tenderness  SKIN:           Warm and dry.  EXTR:            No clubbing, cyanosis or edema  CNS:              Intact sensory and motor function.  PSYCH:        cooperative, no anxiety or depression    LABS:                        11.7   14.5  )-----------( 240      ( 09 Aug 2017 06:53 )             36.2     08-09    139  |  102  |  38<H>  ----------------------------<  145<H>  3.6   |  25  |  1.64<H>    Ca    10.0      09 Aug 2017 06:53  Mg     1.7     08-09    TPro  7.2  /  Alb  3.3  /  TBili  0.3  /  DBili  x   /  AST  15  /  ALT  24  /  AlkPhos  103  08-09    EKG: sinus rhythm    RADIOLOGY & ADDITIONAL STUDIES:  < from: CT Chest No Cont (08.07.17 @ 13:18) >    EXAM:  CT CHEST                          PROCEDURE DATE:  08/07/2017      INTERPRETATION:  CLINICAL INFORMATION: Coughing, shortness of breath    COMPARISON: No prior CT studies are available for comparison.    PROCEDURE:     Serial axial sections through the chest were obtained without   administration of nonionic intravenous contrast. Sagittal, MIP and   coronal reformats were then generated from the axial images.    FINDINGS:     LUNG AND LARGE AIRWAYS: The tracheobronchial tree is patent. There is   slightly increased peripheral reticular opacities as well as mild   bronchiectasis. There are a few scattered peripheral ground glass   opacities. There are a few scattered granulomas as well as additional   sub-4 mm nodules which are nonspecific.  PLEURA: No pleural effusion.  VESSELS: Marked atherosclerotic changes of the aorta and branching vessels  HEART: Normal size. No pericardial effusion.  MEDIASTINUM AND MARY: Within normal limits.  CHEST WALL AND LOWER NECK: The left thyroid lobe is enlarged and   heterogeneous and extends into the upper mediastinum.    UPPER ABDOMEN: Mild nonspecific left adrenal gland thickening.    BONES: Degenerative changes of the spine. Epidural catheter is seen   terminating level of T7.     IMPRESSION:     No focal consolidation or pleural effusion. Chronic lung changes with   mild bronchiectasis.     Scattered sub-4mm nodules are nonspecific.    Additional findings as above.    CHECO KEENE M.D., ATTENDING RADIOLOGIST  This document has been electronically signed. Aug  7 2017  2:07PM     ASSESSMENT AND PLAN:  ·	SOB.  ·	Infected Bronchiectasis.  ·	Leukocytosis.  ·	HTN.    Continue nebulizer, will add Mucomyst.  Continue antibiotics and steroids.  DVT prophylaxis.  Discussed with family at bed side.

## 2017-08-09 NOTE — PHYSICAL THERAPY INITIAL EVALUATION ADULT - ADDITIONAL COMMENTS
Pt lives alone in a two family home. Daughter lives on 1st floor. 3 entry steps (+ rail). Pt lives on 2nd floor (14 steps, + rail). Prior to admission pt was independent with all functional mobility. Pt does not use assistive device for household ambulation but states she requires hand held assist for community ambulation. Pt is right hand dominant & wears eye glasses for reading. Goal of therapy: return home.

## 2017-08-10 LAB
ANION GAP SERPL CALC-SCNC: 13 MMOL/L — SIGNIFICANT CHANGE UP (ref 5–17)
BUN SERPL-MCNC: 39 MG/DL — HIGH (ref 7–23)
CALCIUM SERPL-MCNC: 10.2 MG/DL — HIGH (ref 8.5–10.1)
CHLORIDE SERPL-SCNC: 99 MMOL/L — SIGNIFICANT CHANGE UP (ref 96–108)
CO2 SERPL-SCNC: 27 MMOL/L — SIGNIFICANT CHANGE UP (ref 22–31)
CREAT SERPL-MCNC: 1.57 MG/DL — HIGH (ref 0.5–1.3)
GLUCOSE SERPL-MCNC: 132 MG/DL — HIGH (ref 70–99)
HCT VFR BLD CALC: 39.8 % — SIGNIFICANT CHANGE UP (ref 34.5–45)
HGB BLD-MCNC: 12.8 G/DL — SIGNIFICANT CHANGE UP (ref 11.5–15.5)
MCHC RBC-ENTMCNC: 28.4 PG — SIGNIFICANT CHANGE UP (ref 27–34)
MCHC RBC-ENTMCNC: 32.1 GM/DL — SIGNIFICANT CHANGE UP (ref 32–36)
MCV RBC AUTO: 88.2 FL — SIGNIFICANT CHANGE UP (ref 80–100)
PLATELET # BLD AUTO: 245 K/UL — SIGNIFICANT CHANGE UP (ref 150–400)
POTASSIUM SERPL-MCNC: 3.5 MMOL/L — SIGNIFICANT CHANGE UP (ref 3.5–5.3)
POTASSIUM SERPL-SCNC: 3.5 MMOL/L — SIGNIFICANT CHANGE UP (ref 3.5–5.3)
RBC # BLD: 4.51 M/UL — SIGNIFICANT CHANGE UP (ref 3.8–5.2)
RBC # FLD: 13.2 % — SIGNIFICANT CHANGE UP (ref 11–15)
SODIUM SERPL-SCNC: 139 MMOL/L — SIGNIFICANT CHANGE UP (ref 135–145)
WBC # BLD: 12.8 K/UL — HIGH (ref 3.8–10.5)
WBC # FLD AUTO: 12.8 K/UL — HIGH (ref 3.8–10.5)

## 2017-08-10 PROCEDURE — 99233 SBSQ HOSP IP/OBS HIGH 50: CPT

## 2017-08-10 RX ADMIN — SERTRALINE 25 MILLIGRAM(S): 25 TABLET, FILM COATED ORAL at 11:37

## 2017-08-10 RX ADMIN — Medication 3 MILLILITER(S): at 06:06

## 2017-08-10 RX ADMIN — Medication 3 MILLILITER(S): at 17:23

## 2017-08-10 RX ADMIN — Medication 100 MILLIGRAM(S): at 21:29

## 2017-08-10 RX ADMIN — Medication 1 TABLET(S): at 17:10

## 2017-08-10 RX ADMIN — AMLODIPINE BESYLATE 5 MILLIGRAM(S): 2.5 TABLET ORAL at 06:15

## 2017-08-10 RX ADMIN — SIMVASTATIN 20 MILLIGRAM(S): 20 TABLET, FILM COATED ORAL at 21:29

## 2017-08-10 RX ADMIN — LORATADINE 10 MILLIGRAM(S): 10 TABLET ORAL at 11:37

## 2017-08-10 RX ADMIN — HEPARIN SODIUM 5000 UNIT(S): 5000 INJECTION INTRAVENOUS; SUBCUTANEOUS at 13:03

## 2017-08-10 RX ADMIN — PANTOPRAZOLE SODIUM 40 MILLIGRAM(S): 20 TABLET, DELAYED RELEASE ORAL at 06:15

## 2017-08-10 RX ADMIN — Medication 40 MILLIGRAM(S): at 06:15

## 2017-08-10 RX ADMIN — AZITHROMYCIN 500 MILLIGRAM(S): 500 TABLET, FILM COATED ORAL at 11:37

## 2017-08-10 RX ADMIN — Medication 100 MILLIGRAM(S): at 11:37

## 2017-08-10 RX ADMIN — HEPARIN SODIUM 5000 UNIT(S): 5000 INJECTION INTRAVENOUS; SUBCUTANEOUS at 21:28

## 2017-08-10 RX ADMIN — Medication 3 MILLILITER(S): at 11:03

## 2017-08-10 RX ADMIN — Medication 20 MILLIGRAM(S): at 17:10

## 2017-08-10 RX ADMIN — HEPARIN SODIUM 5000 UNIT(S): 5000 INJECTION INTRAVENOUS; SUBCUTANEOUS at 06:15

## 2017-08-10 NOTE — PROGRESS NOTE ADULT - ASSESSMENT
89 y/o your female with acute bronchiectasis improving on steroids pulmonology has not been buy to see yet will fausto kim home in am with prednisone taper. patient will likely have intermittent cough for a while
89 y/o your female with acute bronchiectasis improving on steroids pulmonology has not been buy to see yet will fausto kim home in am with prednisone taper. patient will likely have intermittent cough for a while
89 y/o your female with acute bronchiectasis improving on steroids

## 2017-08-10 NOTE — PROGRESS NOTE ADULT - SUBJECTIVE AND OBJECTIVE BOX
Patient is a 89y old  Female who presents with a chief complaint of short of breath (07 Aug 2017 17:08)      INTERVAL HPI/OVERNIGHT EVENTS: continues with cough otherwise feels ok     MEDICATIONS  (STANDING):  heparin  Injectable 5000 Unit(s) SubCutaneous every 8 hours  sertraline 25 milliGRAM(s) Oral daily  simvastatin 20 milliGRAM(s) Oral at bedtime  amLODIPine   Tablet 5 milliGRAM(s) Oral daily  pantoprazole    Tablet 40 milliGRAM(s) Oral before breakfast  ALBUTerol/ipratropium for Nebulization 3 milliLiter(s) Nebulizer every 6 hours  loratadine 10 milliGRAM(s) Oral daily  docusate sodium 100 milliGRAM(s) Oral daily  azithromycin   Tablet 500 milliGRAM(s) Oral daily  amoxicillin  500 milliGRAM(s)/clavulanate 1 Tablet(s) Oral every 12 hours  methylPREDNISolone sodium succinate Injectable 20 milliGRAM(s) IV Push every 12 hours    MEDICATIONS  (PRN):  benzonatate 100 milliGRAM(s) Oral three times a day PRN Cough      Allergies    No Known Allergies    Intolerances        REVIEW OF SYSTEMS:  CONSTITUTIONAL: No fever, weight loss, or fatigue  EYES: No eye pain, visual disturbances, or discharge  ENMT:  No difficulty hearing, tinnitus, vertigo; No sinus or throat pain  NECK: No pain or stiffness  BREASTS: No pain, masses, or nipple discharge  RESPIRATORY: cough slight wheeze shortness of breath   CARDIOVASCULAR: No chest pain, palpitations, dizziness, or leg swelling  GASTROINTESTINAL: No abdominal or epigastric pain. No nausea, vomiting, or hematemesis; No diarrhea or constipation. No melena or hematochezia.  GENITOURINARY: No dysuria, frequency, hematuria, or incontinence  NEUROLOGICAL: No headaches, memory loss, loss of strength, numbness, or tremors  SKIN: No itching, burning, rashes, or lesions   LYMPH NODES: No enlarged glands  ENDOCRINE: No heat or cold intolerance; No hair loss  MUSCULOSKELETAL: No joint pain or swelling; No muscle, back, or extremity pain  PSYCHIATRIC: No depression, anxiety, mood swings, or difficulty sleeping  HEME/LYMPH: No easy bruising, or bleeding gums  ALLERGY AND IMMUNOLOGIC: No hives or eczema    Vital Signs Last 24 Hrs  T(C): 36.7 (10 Aug 2017 11:04), Max: 36.7 (10 Aug 2017 11:04)  T(F): 98 (10 Aug 2017 11:04), Max: 98 (10 Aug 2017 11:04)  HR: 101 (10 Aug 2017 11:15) (94 - 105)  BP: 137/61 (10 Aug 2017 11:04) (137/61 - 151/62)  BP(mean): --  RR: 16 (10 Aug 2017 11:04) (16 - 17)  SpO2: 96% (10 Aug 2017 11:15) (95% - 99%)    PHYSICAL EXAM:  GENERAL: NAD, well-groomed, well-developed  HEAD:  Atraumatic, Normocephalic  EYES: EOMI, PERRLA, conjunctiva and sclera clear  ENMT: No tonsillar erythema, exudates, or enlargement; Moist mucous membranes, Good dentition, No lesions  NECK: Supple, No JVD, Normal thyroid  NERVOUS SYSTEM:  Alert & Oriented X3, Good concentration; Motor Strength 5/5 B/L upper and lower extremities; DTRs 2+ intact and symmetric  CHEST/LUNG: bronchial rhonchi   HEART: Regular rate and rhythm; No murmurs, rubs, or gallops  ABDOMEN: Soft, Nontender, Nondistended; Bowel sounds present  EXTREMITIES:  2+ Peripheral Pulses, No clubbing, cyanosis, or edema  LYMPH: No lymphadenopathy noted  SKIN: No rashes or lesions    LABS:                        12.8   12.8  )-----------( 245      ( 10 Aug 2017 07:51 )             39.8     08-10    139  |  99  |  39<H>  ----------------------------<  132<H>  3.5   |  27  |  1.57<H>    Ca    10.2<H>      10 Aug 2017 07:51  Mg     1.7     08-09    TPro  7.2  /  Alb  3.3  /  TBili  0.3  /  DBili  x   /  AST  15  /  ALT  24  /  AlkPhos  103  08-09        CAPILLARY BLOOD GLUCOSE          RADIOLOGY & ADDITIONAL TESTS:    Imaging Personally Reviewed:  [X ] YES  [ ] NO    Consultant(s) Notes Reviewed:  [X ] YES  [ ] NO    Care Discussed with Consultants/Other Providers [ X] YES  [ ] NO

## 2017-08-10 NOTE — PROGRESS NOTE ADULT - SUBJECTIVE AND OBJECTIVE BOX
INTERVAL HPI:  89yoF; with PMH signif for HTN and Pneumonias  now p/w cough and sob x3-4 days.  cough--yellow productive cough. associated with sob. denies cp/palp. denies f/c/s. denies sick contacts. denies travel. denies abd pain. denies n/v/d. denies dysuria, hematuria, frequency, urgency. (07 Aug 2017 17:08).  Admitted with sob and infected bronchiectasis.  Smoked for 30 years, quit 40 years ago.    OVERNIGHT EVENTS:  Still reports sob and productive cough.    Vital Signs Last 24 Hrs  T(C): 36.7 (10 Aug 2017 11:04), Max: 36.7 (10 Aug 2017 11:04)  T(F): 98 (10 Aug 2017 11:04), Max: 98 (10 Aug 2017 11:04)  HR: 101 (10 Aug 2017 11:15) (94 - 105)  BP: 137/61 (10 Aug 2017 11:04) (137/61 - 151/62)  BP(mean): --  RR: 16 (10 Aug 2017 11:04) (16 - 17)  SpO2: 96% (10 Aug 2017 11:15) (95% - 99%)    PHYSICAL EXAM:  GEN:         Awake, responsive and comfortable.  HEENT:    Normal.    RESP:     no wheezing.  CVS:         Regular rate and rhythm.   ABD:         Soft, non-tender, non-distended;     MEDICATIONS  (STANDING):  heparin  Injectable 5000 Unit(s) SubCutaneous every 8 hours  sertraline 25 milliGRAM(s) Oral daily  simvastatin 20 milliGRAM(s) Oral at bedtime  amLODIPine   Tablet 5 milliGRAM(s) Oral daily  pantoprazole    Tablet 40 milliGRAM(s) Oral before breakfast  ALBUTerol/ipratropium for Nebulization 3 milliLiter(s) Nebulizer every 6 hours  methylPREDNISolone sodium succinate Injectable 40 milliGRAM(s) IV Push every 8 hours  loratadine 10 milliGRAM(s) Oral daily  docusate sodium 100 milliGRAM(s) Oral daily  azithromycin   Tablet 500 milliGRAM(s) Oral daily    MEDICATIONS  (PRN):  benzonatate 100 milliGRAM(s) Oral three times a day PRN Cough    LABS:                        12.8   12.8  )-----------( 245      ( 10 Aug 2017 07:51 )             39.8     08-10    139  |  99  |  39<H>  ----------------------------<  132<H>  3.5   |  27  |  1.57<H>    Ca    10.2<H>      10 Aug 2017 07:51  Mg     1.7     08-09    TPro  7.2  /  Alb  3.3  /  TBili  0.3  /  DBili  x   /  AST  15  /  ALT  24  /  AlkPhos  103  08-09    ASSESSMENT AND PLAN:  ·	SOB.  ·	Infected Bronchiectasis.  ·	Leukocytosis.  ·	HTN.    Will reduce steroids.  Add Augmentin .  Continue nebulizer.  SPO2 on room air before discharge for O2 need.

## 2017-08-11 ENCOUNTER — TRANSCRIPTION ENCOUNTER (OUTPATIENT)
Age: 82
End: 2017-08-11

## 2017-08-11 VITALS
SYSTOLIC BLOOD PRESSURE: 164 MMHG | HEART RATE: 104 BPM | TEMPERATURE: 98 F | RESPIRATION RATE: 17 BRPM | OXYGEN SATURATION: 97 % | DIASTOLIC BLOOD PRESSURE: 74 MMHG

## 2017-08-11 LAB
ANION GAP SERPL CALC-SCNC: 14 MMOL/L — SIGNIFICANT CHANGE UP (ref 5–17)
BUN SERPL-MCNC: 40 MG/DL — HIGH (ref 7–23)
CALCIUM SERPL-MCNC: 9.9 MG/DL — SIGNIFICANT CHANGE UP (ref 8.5–10.1)
CHLORIDE SERPL-SCNC: 97 MMOL/L — SIGNIFICANT CHANGE UP (ref 96–108)
CO2 SERPL-SCNC: 25 MMOL/L — SIGNIFICANT CHANGE UP (ref 22–31)
CREAT SERPL-MCNC: 1.49 MG/DL — HIGH (ref 0.5–1.3)
GLUCOSE SERPL-MCNC: 141 MG/DL — HIGH (ref 70–99)
HCT VFR BLD CALC: 40.6 % — SIGNIFICANT CHANGE UP (ref 34.5–45)
HGB BLD-MCNC: 13.3 G/DL — SIGNIFICANT CHANGE UP (ref 11.5–15.5)
MCHC RBC-ENTMCNC: 28.7 PG — SIGNIFICANT CHANGE UP (ref 27–34)
MCHC RBC-ENTMCNC: 32.8 GM/DL — SIGNIFICANT CHANGE UP (ref 32–36)
MCV RBC AUTO: 87.5 FL — SIGNIFICANT CHANGE UP (ref 80–100)
PLATELET # BLD AUTO: 284 K/UL — SIGNIFICANT CHANGE UP (ref 150–400)
POTASSIUM SERPL-MCNC: 3.4 MMOL/L — LOW (ref 3.5–5.3)
POTASSIUM SERPL-SCNC: 3.4 MMOL/L — LOW (ref 3.5–5.3)
RBC # BLD: 4.64 M/UL — SIGNIFICANT CHANGE UP (ref 3.8–5.2)
RBC # FLD: 13.1 % — SIGNIFICANT CHANGE UP (ref 11–15)
SODIUM SERPL-SCNC: 136 MMOL/L — SIGNIFICANT CHANGE UP (ref 135–145)
WBC # BLD: 15.4 K/UL — HIGH (ref 3.8–10.5)
WBC # FLD AUTO: 15.4 K/UL — HIGH (ref 3.8–10.5)

## 2017-08-11 PROCEDURE — 99239 HOSP IP/OBS DSCHRG MGMT >30: CPT

## 2017-08-11 RX ORDER — POTASSIUM CHLORIDE 20 MEQ
20 PACKET (EA) ORAL ONCE
Qty: 0 | Refills: 0 | Status: COMPLETED | OUTPATIENT
Start: 2017-08-11 | End: 2017-08-11

## 2017-08-11 RX ORDER — IPRATROPIUM/ALBUTEROL SULFATE 18-103MCG
3 AEROSOL WITH ADAPTER (GRAM) INHALATION
Qty: 90 | Refills: 0 | OUTPATIENT
Start: 2017-08-11 | End: 2017-09-10

## 2017-08-11 RX ADMIN — Medication 3 MILLILITER(S): at 05:25

## 2017-08-11 RX ADMIN — Medication 20 MILLIGRAM(S): at 05:12

## 2017-08-11 RX ADMIN — AMLODIPINE BESYLATE 5 MILLIGRAM(S): 2.5 TABLET ORAL at 05:12

## 2017-08-11 RX ADMIN — Medication 3 MILLILITER(S): at 13:04

## 2017-08-11 RX ADMIN — Medication 3 MILLILITER(S): at 00:46

## 2017-08-11 RX ADMIN — Medication 10 MILLIGRAM(S): at 05:12

## 2017-08-11 RX ADMIN — LORATADINE 10 MILLIGRAM(S): 10 TABLET ORAL at 12:36

## 2017-08-11 RX ADMIN — HEPARIN SODIUM 5000 UNIT(S): 5000 INJECTION INTRAVENOUS; SUBCUTANEOUS at 05:13

## 2017-08-11 RX ADMIN — Medication 1 TABLET(S): at 05:12

## 2017-08-11 RX ADMIN — Medication 100 MILLIGRAM(S): at 12:36

## 2017-08-11 RX ADMIN — PANTOPRAZOLE SODIUM 40 MILLIGRAM(S): 20 TABLET, DELAYED RELEASE ORAL at 08:24

## 2017-08-11 RX ADMIN — AZITHROMYCIN 500 MILLIGRAM(S): 500 TABLET, FILM COATED ORAL at 12:35

## 2017-08-11 RX ADMIN — Medication 20 MILLIEQUIVALENT(S): at 09:17

## 2017-08-11 RX ADMIN — SERTRALINE 25 MILLIGRAM(S): 25 TABLET, FILM COATED ORAL at 12:36

## 2017-08-11 NOTE — PROGRESS NOTE ADULT - SUBJECTIVE AND OBJECTIVE BOX
INTERVAL HPI:  89yoF; with PMH signif for HTN and Pneumonias  now p/w cough and sob x3-4 days.  cough--yellow productive cough. associated with sob. denies cp/palp. denies f/c/s. denies sick contacts. denies travel. denies abd pain. denies n/v/d. denies dysuria, hematuria, frequency, urgency. (07 Aug 2017 17:08).  Admitted with sob and infected bronchiectasis.  Smoked for 30 years, quit 40 years ago.    OVERNIGHT EVENTS:  Out of bed to chair and feels better.    Vital Signs Last 24 Hrs  T(C): 36.1 (11 Aug 2017 05:04), Max: 36.7 (10 Aug 2017 11:04)  T(F): 97 (11 Aug 2017 05:04), Max: 98 (10 Aug 2017 11:04)  HR: 91 (11 Aug 2017 05:25) (86 - 110)  BP: 169/81 (11 Aug 2017 05:04) (137/61 - 169/81)  BP(mean): --  RR: 17 (11 Aug 2017 05:04) (16 - 17)  SpO2: 98% (11 Aug 2017 05:25) (95% - 98%)    PHYSICAL EXAM:  GEN:        Awake, responsive and comfortable.  HEENT:    Normal.    RESP:       crackles.  CVS:         Regular rate and rhythm.   ABD:         Soft, non-tender, non-distended;     MEDICATIONS  (STANDING):  heparin  Injectable 5000 Unit(s) SubCutaneous every 8 hours  sertraline 25 milliGRAM(s) Oral daily  simvastatin 20 milliGRAM(s) Oral at bedtime  amLODIPine   Tablet 5 milliGRAM(s) Oral daily  pantoprazole    Tablet 40 milliGRAM(s) Oral before breakfast  ALBUTerol/ipratropium for Nebulization 3 milliLiter(s) Nebulizer every 6 hours  loratadine 10 milliGRAM(s) Oral daily  docusate sodium 100 milliGRAM(s) Oral daily  azithromycin   Tablet 500 milliGRAM(s) Oral daily  amoxicillin  500 milliGRAM(s)/clavulanate 1 Tablet(s) Oral every 12 hours  methylPREDNISolone sodium succinate Injectable 20 milliGRAM(s) IV Push every 12 hours    MEDICATIONS  (PRN):  benzonatate 100 milliGRAM(s) Oral three times a day PRN Cough    LABS:                        13.3   15.4  )-----------( 284      ( 11 Aug 2017 07:55 )             40.6     08-11    136  |  97  |  40<H>  ----------------------------<  141<H>  3.4<L>   |  25  |  1.49<H>    Ca    9.9      11 Aug 2017 07:55    ASSESSMENT AND PLAN:  ·	SOB.  ·	Infected Bronchiectasis.  ·	Leukocytosis.  ·	HTN.    Clinically improving.  May DC on tapering steroids over 5-7 days, duoneb nebulizer and Augmentin 500 mg BID for 7 days.  Spo2 on room air before discharge for O2 need.  Discussed with joey.

## 2017-08-11 NOTE — DISCHARGE NOTE ADULT - INSTRUCTIONS
Nebulizer machine ordered will be delivered to home / discharge instructions reviewed with patient and daughter

## 2017-08-11 NOTE — DISCHARGE NOTE ADULT - CARE PLAN
Principal Discharge DX:	Bronchiectasis with acute exacerbation  Goal:	Continue 7 days of Augmentin PO  Instructions for follow-up, activity and diet:	Taper steroids for 6 days  Nebulization at home  Follow up with Dr. Ruffin in 2 weeks  Secondary Diagnosis:	Shortness of breath  Goal:	controlled  Secondary Diagnosis:	Essential hypertension  Instructions for follow-up, activity and diet:	Continue home blood pressure medications  Follow up with PCP  Low-sodium diet  AHA Recipes - [email or web address]  Secondary Diagnosis:	IVA (acute kidney injury)  Instructions for follow-up, activity and diet:	Fluid hydration  Secondary Diagnosis:	HLD (hyperlipidemia)  Instructions for follow-up, activity and diet:	Continue home statin medication  Follow up with PCP  Heart healthy diet - [email or web address]  Secondary Diagnosis:	GERD (gastroesophageal reflux disease)  Instructions for follow-up, activity and diet:	Continue home meds

## 2017-08-11 NOTE — DISCHARGE NOTE ADULT - SECONDARY DIAGNOSIS.
Shortness of breath Essential hypertension IVA (acute kidney injury) HLD (hyperlipidemia) GERD (gastroesophageal reflux disease)

## 2017-08-11 NOTE — DISCHARGE NOTE ADULT - PATIENT PORTAL LINK FT
“You can access the Follow[employer] Patient Portal, offered by [facility], by registering with the following website: [email or web address]”

## 2017-08-11 NOTE — DISCHARGE NOTE ADULT - MEDICATION SUMMARY - MEDICATIONS TO TAKE
I will START or STAY ON the medications listed below when I get home from the hospital:    Medrol Dosepak 4 mg oral tablet  -- Take as directed  -- It is very important that you take or use this exactly as directed.  Do not skip doses or discontinue unless directed by your doctor.  Obtain medical advice before taking any non-prescription drugs as some may affect the action of this medication.  Take with food or milk.    -- Indication: For Bronchiectasis with acute exacerbation    sertraline 25 mg oral tablet  -- 1 tab(s) by mouth once a day  -- Indication: For Antidepressant    Zocor 20 mg oral tablet  -- 1 tab(s) by mouth once a day (at bedtime)  -- Indication: For HLD    albuterol-ipratropium 2.5 mg-0.5 mg/3 mL inhalation solution  -- 3 milliliter(s) inhaled every 6 hours  -- Indication: For Bronchiectasis    amLODIPine 5 mg oral tablet  -- 1 tab(s) by mouth once a day  -- Indication: For HTN    hydroCHLOROthiazide 25 mg oral tablet  -- 1 tab(s) by mouth once a day  -- Indication: For HTN    Klor-Con 8 oral tablet, extended release  -- 1 tab(s) by mouth 2 times a day  -- Indication: For Supplement    amoxicillin-clavulanate 500 mg-125 mg oral tablet  -- 1 tab(s) by mouth every 12 hours  -- Indication: For Bronchiectasis    omeprazole 20 mg oral delayed release capsule  -- 1 cap(s) by mouth once a day  -- Indication: For GERD

## 2017-08-11 NOTE — DISCHARGE NOTE ADULT - HOSPITAL COURSE
89 y/o your female with acute bronchiectasis improving on steroids. Patient will likely have intermittent cough for a while. Pt to be discharged with Augmentin x 7 days, nebulization at home, Medrol Dose Wander taper, and follow up with Dr. Ruffin in 2 weeks.    Problem/Plan - 1:  ·  Problem: Bronchiectasis with acute exacerbation.  Plan: pulmonary consult appreciated   -azithromycin and Agmentin po added   -steroids.     Problem/Plan - 2:  ·  Problem: IVA (acute kidney injury).  Plan: continue fluid hydration    Problem/Plan - 3:  ·  Problem: Shortness of breath.  Plan: nebs/steroids  will add azithromycin no obvious consolidation   tessalon for cough.     Problem/Plan - 4:  ·  Problem: Essential hypertension.  Plan: renew meds. 89 y/o your female with acute bronchiectasis improving on steroids. Patient will likely have intermittent cough for a while. Pt to be discharged with Augmentin x 7 days, nebulization at home, Medrol Dose Wander taper, and follow up with Dr. Ruffin in 2 weeks.    Problem/Plan - 1:  ·  Problem: Bronchiectasis with acute exacerbation.  Plan: pulmonary consult appreciated   -azithromycin and Agmentin po added   -steroids.     Problem/Plan - 2:  ·  Problem: IVA (acute kidney injury).  Plan: continue fluid hydration    Problem/Plan - 3:  ·  Problem: Shortness of breath.  Plan: nebs/steroids  will add azithromycin no obvious consolidation   tessalon for cough.       35 minutes spent on this discharge           Problem/Plan - 4:  ·  Problem: Essential hypertension.  Plan: renew meds.

## 2017-08-11 NOTE — CHART NOTE - NSCHARTNOTEFT_GEN_A_CORE
Patient is a 89 y/o w/ bronchiectasis with acute exacerbation of SOB and wheezing. [place] treatment is for home nebulization.

## 2017-08-11 NOTE — DISCHARGE NOTE ADULT - CARE PROVIDER_API CALL
Brooke Ruffin (MBBS), Medicine  [address]  [address]  [place] [address]  Phone: [phone]  Fax: [phone]    PMD,   Phone: (   )    -  Fax: (   )    -

## 2017-08-11 NOTE — DISCHARGE NOTE ADULT - PLAN OF CARE
Continue 7 days of Augmentin PO Taper steroids for 6 days  Nebulization at home  Follow up with Dr. Ruffin in 2 weeks controlled Continue home blood pressure medications  Follow up with PCP  Low-sodium diet  AHA Recipes - [email or web address] Fluid hydration Continue home statin medication  Follow up with PCP  Heart healthy diet - [email or web address] Continue home meds

## 2017-08-12 LAB
CULTURE RESULTS: SIGNIFICANT CHANGE UP
CULTURE RESULTS: SIGNIFICANT CHANGE UP
SPECIMEN SOURCE: SIGNIFICANT CHANGE UP
SPECIMEN SOURCE: SIGNIFICANT CHANGE UP

## 2017-08-14 DIAGNOSIS — J47.1 BRONCHIECTASIS WITH (ACUTE) EXACERBATION: ICD-10-CM

## 2017-08-14 DIAGNOSIS — I10 ESSENTIAL (PRIMARY) HYPERTENSION: ICD-10-CM

## 2017-08-14 DIAGNOSIS — K21.9 GASTRO-ESOPHAGEAL REFLUX DISEASE WITHOUT ESOPHAGITIS: ICD-10-CM

## 2017-08-14 DIAGNOSIS — N17.9 ACUTE KIDNEY FAILURE, UNSPECIFIED: ICD-10-CM

## 2017-08-14 DIAGNOSIS — Z87.891 PERSONAL HISTORY OF NICOTINE DEPENDENCE: ICD-10-CM

## 2017-08-14 DIAGNOSIS — E78.5 HYPERLIPIDEMIA, UNSPECIFIED: ICD-10-CM

## 2017-10-31 NOTE — INPATIENT CERTIFICATION FOR MEDICARE PATIENTS - IN ORDER TO MEET MEDICARE REQUIREMENTS.
In order to meet Medicare requirements, the clinical documentation must support the information cited in the admission order.  Please be sure to provide detailed and clear documentation about the following in the admitting note/history and physical:
 (normal spontaneous vaginal delivery)

## 2017-12-07 PROBLEM — Z00.00 ENCOUNTER FOR PREVENTIVE HEALTH EXAMINATION: Status: ACTIVE | Noted: 2017-12-07

## 2018-01-22 ENCOUNTER — APPOINTMENT (OUTPATIENT)
Dept: INTERNAL MEDICINE | Facility: CLINIC | Age: 83
End: 2018-01-22
Payer: MEDICARE

## 2018-01-22 VITALS — WEIGHT: 114 LBS | BODY MASS INDEX: 22.38 KG/M2 | HEIGHT: 60 IN

## 2018-01-22 VITALS — RESPIRATION RATE: 12 BRPM | DIASTOLIC BLOOD PRESSURE: 56 MMHG | SYSTOLIC BLOOD PRESSURE: 124 MMHG | HEART RATE: 72 BPM

## 2018-01-22 PROCEDURE — 99204 OFFICE O/P NEW MOD 45 MIN: CPT

## 2018-01-22 RX ORDER — AMOXICILLIN AND CLAVULANATE POTASSIUM 500; 125 MG/1; MG/1
500-125 TABLET, FILM COATED ORAL
Qty: 14 | Refills: 0 | Status: COMPLETED | COMMUNITY
Start: 2017-08-11

## 2018-01-22 RX ORDER — CEFDINIR 300 MG/1
300 CAPSULE ORAL
Qty: 20 | Refills: 0 | Status: COMPLETED | COMMUNITY
Start: 2017-10-02

## 2018-01-22 RX ORDER — CEFUROXIME AXETIL 500 MG/1
500 TABLET ORAL
Qty: 10 | Refills: 0 | Status: COMPLETED | COMMUNITY
Start: 2017-10-31

## 2018-01-22 RX ORDER — METHYLPREDNISOLONE 4 MG/1
4 TABLET ORAL
Qty: 21 | Refills: 0 | Status: COMPLETED | COMMUNITY
Start: 2017-08-11

## 2018-01-22 RX ORDER — PREDNISONE 20 MG/1
20 TABLET ORAL
Qty: 6 | Refills: 0 | Status: COMPLETED | COMMUNITY
Start: 2017-10-11

## 2018-01-22 RX ORDER — LIDOCAINE HYDROCHLORIDE 20 MG/ML
2 SOLUTION OROPHARYNGEAL
Qty: 200 | Refills: 0 | Status: COMPLETED | COMMUNITY
Start: 2017-11-29

## 2018-01-22 RX ORDER — SERTRALINE HYDROCHLORIDE 50 MG/1
50 TABLET, FILM COATED ORAL
Qty: 90 | Refills: 0 | Status: COMPLETED | COMMUNITY
Start: 2017-10-29

## 2018-04-26 ENCOUNTER — APPOINTMENT (OUTPATIENT)
Dept: INTERNAL MEDICINE | Facility: CLINIC | Age: 83
End: 2018-04-26
Payer: MEDICARE

## 2018-04-26 VITALS — RESPIRATION RATE: 12 BRPM | DIASTOLIC BLOOD PRESSURE: 54 MMHG | SYSTOLIC BLOOD PRESSURE: 108 MMHG | HEART RATE: 68 BPM

## 2018-04-26 VITALS — HEIGHT: 60 IN | WEIGHT: 115 LBS | BODY MASS INDEX: 22.58 KG/M2

## 2018-04-26 DIAGNOSIS — E78.5 HYPERLIPIDEMIA, UNSPECIFIED: ICD-10-CM

## 2018-04-26 PROCEDURE — 36415 COLL VENOUS BLD VENIPUNCTURE: CPT

## 2018-04-26 PROCEDURE — 99214 OFFICE O/P EST MOD 30 MIN: CPT | Mod: 25

## 2018-04-27 LAB
ALBUMIN SERPL ELPH-MCNC: 4.1 G/DL
ALP BLD-CCNC: 98 U/L
ALT SERPL-CCNC: 14 U/L
ANION GAP SERPL CALC-SCNC: 19 MMOL/L
AST SERPL-CCNC: 18 U/L
BASOPHILS # BLD AUTO: 0.02 K/UL
BASOPHILS NFR BLD AUTO: 0.3 %
BILIRUB SERPL-MCNC: 0.2 MG/DL
BUN SERPL-MCNC: 38 MG/DL
CALCIUM SERPL-MCNC: 10.8 MG/DL
CHLORIDE SERPL-SCNC: 100 MMOL/L
CHOLEST SERPL-MCNC: 217 MG/DL
CHOLEST/HDLC SERPL: 3.9 RATIO
CO2 SERPL-SCNC: 22 MMOL/L
CREAT SERPL-MCNC: 1.33 MG/DL
EOSINOPHIL # BLD AUTO: 0.12 K/UL
EOSINOPHIL NFR BLD AUTO: 1.5 %
GLUCOSE SERPL-MCNC: 120 MG/DL
HBA1C MFR BLD HPLC: 5.7 %
HCT VFR BLD CALC: 36.5 %
HDLC SERPL-MCNC: 56 MG/DL
HGB BLD-MCNC: 12.1 G/DL
IMM GRANULOCYTES NFR BLD AUTO: 0.3 %
LDLC SERPL CALC-MCNC: 98 MG/DL
LYMPHOCYTES # BLD AUTO: 3.01 K/UL
LYMPHOCYTES NFR BLD AUTO: 37.8 %
MAN DIFF?: NORMAL
MCHC RBC-ENTMCNC: 27.9 PG
MCHC RBC-ENTMCNC: 33.2 GM/DL
MCV RBC AUTO: 84.3 FL
MONOCYTES # BLD AUTO: 0.77 K/UL
MONOCYTES NFR BLD AUTO: 9.7 %
NEUTROPHILS # BLD AUTO: 4.03 K/UL
NEUTROPHILS NFR BLD AUTO: 50.4 %
PLATELET # BLD AUTO: 325 K/UL
POTASSIUM SERPL-SCNC: 4.7 MMOL/L
PROT SERPL-MCNC: 6.9 G/DL
RBC # BLD: 4.33 M/UL
RBC # FLD: 15.2 %
SODIUM SERPL-SCNC: 141 MMOL/L
TRIGL SERPL-MCNC: 317 MG/DL
TSH SERPL-ACNC: 0.85 UIU/ML
WBC # FLD AUTO: 7.97 K/UL

## 2018-05-07 ENCOUNTER — RX RENEWAL (OUTPATIENT)
Age: 83
End: 2018-05-07

## 2018-07-26 ENCOUNTER — APPOINTMENT (OUTPATIENT)
Dept: INTERNAL MEDICINE | Facility: CLINIC | Age: 83
End: 2018-07-26
Payer: MEDICARE

## 2018-07-26 VITALS — WEIGHT: 113 LBS | HEIGHT: 60 IN | BODY MASS INDEX: 22.19 KG/M2

## 2018-07-26 VITALS — HEART RATE: 70 BPM | DIASTOLIC BLOOD PRESSURE: 62 MMHG | SYSTOLIC BLOOD PRESSURE: 132 MMHG | RESPIRATION RATE: 12 BRPM

## 2018-07-26 PROCEDURE — 99214 OFFICE O/P EST MOD 30 MIN: CPT

## 2018-07-26 RX ORDER — CEFPODOXIME PROXETIL 200 MG/1
200 TABLET, FILM COATED ORAL
Qty: 14 | Refills: 0 | Status: COMPLETED | COMMUNITY
Start: 2018-02-12

## 2018-07-26 NOTE — PHYSICAL EXAM
[No Acute Distress] : no acute distress [Well Nourished] : well nourished [Well Developed] : well developed [Well-Appearing] : well-appearing [Normal Sclera/Conjunctiva] : normal sclera/conjunctiva [PERRL] : pupils equal round and reactive to light [EOMI] : extraocular movements intact [Normal Outer Ear/Nose] : the outer ears and nose were normal in appearance [Normal Oropharynx] : the oropharynx was normal [No JVD] : no jugular venous distention [Supple] : supple [No Lymphadenopathy] : no lymphadenopathy [Thyroid Normal, No Nodules] : the thyroid was normal and there were no nodules present [No Respiratory Distress] : no respiratory distress  [No Accessory Muscle Use] : no accessory muscle use [Normal Rate] : normal rate  [Regular Rhythm] : with a regular rhythm [Normal S1, S2] : normal S1 and S2 [No Murmur] : no murmur heard [No Carotid Bruits] : no carotid bruits [No Abdominal Bruit] : a ~M bruit was not heard ~T in the abdomen [No Varicosities] : no varicosities [Pedal Pulses Present] : the pedal pulses are present [No Edema] : there was no peripheral edema [No Extremity Clubbing/Cyanosis] : no extremity clubbing/cyanosis [No Palpable Aorta] : no palpable aorta [Soft] : abdomen soft [Non Tender] : non-tender [Non-distended] : non-distended [No Masses] : no abdominal mass palpated [No HSM] : no HSM [Normal Bowel Sounds] : normal bowel sounds [Normal Supraclavicular Nodes] : no supraclavicular lymphadenopathy [Normal Posterior Cervical Nodes] : no posterior cervical lymphadenopathy [Normal Anterior Cervical Nodes] : no anterior cervical lymphadenopathy [No CVA Tenderness] : no CVA  tenderness [No Spinal Tenderness] : no spinal tenderness [No Joint Swelling] : no joint swelling [Grossly Normal Strength/Tone] : grossly normal strength/tone [No Rash] : no rash [Normal Gait] : normal gait [Coordination Grossly Intact] : coordination grossly intact [No Focal Deficits] : no focal deficits [Normal Affect] : the affect was normal [Alert and Oriented x3] : oriented to person, place, and time [Normal Insight/Judgement] : insight and judgment were intact [Comprehensive Foot Exam Normal] : Right and left foot were examined and both feet are normal. No ulcers in either foot. Toes are normal and with full ROM.  Normal tactile sensation with monofilament testing throughout both feet [de-identified] : IOL bilaleral [de-identified] :  Hearing aides [de-identified] : Fine basilar rales  fibrosis? [de-identified] : Bronze from solar exposure on chst and legs

## 2018-07-26 NOTE — ASSESSMENT
[FreeTextEntry1] : 89 with COPD and possible fibrosis and hx of recurrent hosp admissions for resp insuff and sees Dr MARZENA Lopez and Chantel\par Memory loss with agitation.  Ok on Aricept.  Dtr feels Zoloft 25 is adequate and declines to increase dose. \par HCVD ok\par GERD pt feels PPI is necessary qd\par s/p IOL bilat\par Consider Prevnar at next visit since dtr not sure if she had it. \par Using walker at home only.  Advised Leah to consider outside walker use and she said she will.\par Independent in house with all personal care including bathing in tub with minor help to wash back.  Both dtrs have schedule to be with her.   \par Finger tingling is a long standing problem and not looking for an Rx or other treatment\par Patient has "all of her own teeth" and eats well. \par

## 2018-07-26 NOTE — HISTORY OF PRESENT ILLNESS
[Other: _____] : [unfilled] [FreeTextEntry1] : Eval of OMS HTN chol GERD and needs PPI qd. \par C/o finger tingling of fingers for a long time.  Not looking for a treatment.  \mar Lives in mother dtr house.  Both dtrs involved.  Leah stays with her 6 nights a week.  \par Independent in all personal care and able to bathe in tub with minor back washing assistance from dtr.  Has walker for home use.  Goes outside without walker.  \mar Goes out in the sun

## 2018-07-26 NOTE — REVIEW OF SYSTEMS
[Negative] : Heme/Lymph [FreeTextEntry2] : see HPI [FreeTextEntry9] : see HPI [de-identified] : see HPI

## 2018-07-26 NOTE — HEALTH RISK ASSESSMENT
[] : No [No falls in past year] : Patient reported no falls in the past year [0] : 2) Feeling down, depressed, or hopeless: Not at all (0) [[id number]] : 0

## 2018-08-17 ENCOUNTER — APPOINTMENT (OUTPATIENT)
Dept: INTERNAL MEDICINE | Facility: CLINIC | Age: 83
End: 2018-08-17
Payer: MEDICARE

## 2018-08-17 VITALS — HEART RATE: 72 BPM | DIASTOLIC BLOOD PRESSURE: 60 MMHG | SYSTOLIC BLOOD PRESSURE: 130 MMHG | RESPIRATION RATE: 12 BRPM

## 2018-08-17 VITALS — WEIGHT: 113 LBS | HEIGHT: 60 IN | BODY MASS INDEX: 22.19 KG/M2

## 2018-08-17 DIAGNOSIS — B36.9 SUPERFICIAL MYCOSIS, UNSPECIFIED: ICD-10-CM

## 2018-08-17 DIAGNOSIS — L30.9 DERMATITIS, UNSPECIFIED: ICD-10-CM

## 2018-08-17 PROCEDURE — 99213 OFFICE O/P EST LOW 20 MIN: CPT

## 2018-08-17 RX ORDER — CLOTRIMAZOLE AND BETAMETHASONE DIPROPIONATE 10; .5 MG/ML; MG/ML
1-0.05 LOTION TOPICAL TWICE DAILY
Qty: 2 | Refills: 5 | Status: ACTIVE | COMMUNITY
Start: 2018-08-17 | End: 1900-01-01

## 2018-08-17 NOTE — ASSESSMENT
[FreeTextEntry1] : Fungal dermatitis under bilat breasts.  R is worse.  Patient needs a supportive bra.  Start Lotrisone.  Advise powder once resolved.  \par CRF noted and stable\par HCVD stable\par OMS with confusion is mod and managed by 2 dtrs.  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  Ok on Aricept.  Dtr feels Zoloft 25 is adequate and declines to increase dose. \par HCVD ok\par GERD pt feels PPI is necessary qd\par s/p IOL bilat\par Consider Prevnar at next visit since dtr not sure if she had it. \par Using walker at home only.  Advised Leah to consider outside walker use and she said she will.\par Independent in house with all personal care including bathing in tub with minor help to wash back.  Both dtrs have schedule to be with her.   \par Finger tingling is a long standing problem and not looking for an Rx or other treatment\par Patient has "all of her own teeth" and eats well. \par

## 2018-08-17 NOTE — HISTORY OF PRESENT ILLNESS
[FreeTextEntry8] : Several mo hx of a rash under R breast. \par No drainage.  \par Does not wear a bra. [Other: _____] : [unfilled]

## 2018-08-17 NOTE — HEALTH RISK ASSESSMENT
[] : No [No falls in past year] : Patient reported no falls in the past year [0] : 2) Feeling down, depressed, or hopeless: Not at all (0) [[id number]] : 0

## 2018-10-25 ENCOUNTER — APPOINTMENT (OUTPATIENT)
Dept: INTERNAL MEDICINE | Facility: CLINIC | Age: 83
End: 2018-10-25
Payer: MEDICARE

## 2018-10-25 VITALS — SYSTOLIC BLOOD PRESSURE: 128 MMHG | HEART RATE: 75 BPM | DIASTOLIC BLOOD PRESSURE: 56 MMHG | RESPIRATION RATE: 12 BRPM

## 2018-10-25 VITALS — WEIGHT: 109 LBS | BODY MASS INDEX: 21.4 KG/M2 | HEIGHT: 60 IN

## 2018-10-25 VITALS — SYSTOLIC BLOOD PRESSURE: 130 MMHG | DIASTOLIC BLOOD PRESSURE: 58 MMHG

## 2018-10-25 DIAGNOSIS — G56.01 CARPAL TUNNEL SYNDROME, RIGHT UPPER LIMB: ICD-10-CM

## 2018-10-25 PROCEDURE — G0009: CPT

## 2018-10-25 PROCEDURE — 90662 IIV NO PRSV INCREASED AG IM: CPT

## 2018-10-25 PROCEDURE — 99214 OFFICE O/P EST MOD 30 MIN: CPT | Mod: 25

## 2018-10-25 PROCEDURE — G0008: CPT

## 2018-10-25 PROCEDURE — 90732 PPSV23 VACC 2 YRS+ SUBQ/IM: CPT

## 2018-10-25 NOTE — HEALTH RISK ASSESSMENT
[No falls in past year] : Patient reported no falls in the past year [0] : 2) Feeling down, depressed, or hopeless: Not at all (0) [] : No [[id number]] : 0

## 2018-10-25 NOTE — HISTORY OF PRESENT ILLNESS
[Other: _____] : [unfilled] [FreeTextEntry1] : Eval of OMS HTN chol GERD and needs PPI qd. \par C/o finger tingling of fingers for a long time. Getting acupunture for R hand CTS\mar Lives in mother dtr house.  Both dtrs involved.  Leah stays with her 6 nights a week.  \par Independent in all personal care and able to bathe in tub with minor back washing assistance from dtr.  Has walker for home use.  Goes outside without walker.  \par Goes out in the sun

## 2018-10-25 NOTE — PHYSICAL EXAM
[No Acute Distress] : no acute distress [Well Nourished] : well nourished [Well Developed] : well developed [Well-Appearing] : well-appearing [Normal Sclera/Conjunctiva] : normal sclera/conjunctiva [PERRL] : pupils equal round and reactive to light [EOMI] : extraocular movements intact [Normal Outer Ear/Nose] : the outer ears and nose were normal in appearance [Normal Oropharynx] : the oropharynx was normal [No JVD] : no jugular venous distention [Supple] : supple [No Lymphadenopathy] : no lymphadenopathy [Thyroid Normal, No Nodules] : the thyroid was normal and there were no nodules present [No Respiratory Distress] : no respiratory distress  [No Accessory Muscle Use] : no accessory muscle use [Normal Rate] : normal rate  [Regular Rhythm] : with a regular rhythm [Normal S1, S2] : normal S1 and S2 [No Murmur] : no murmur heard [No Carotid Bruits] : no carotid bruits [No Abdominal Bruit] : a ~M bruit was not heard ~T in the abdomen [No Varicosities] : no varicosities [Pedal Pulses Present] : the pedal pulses are present [No Edema] : there was no peripheral edema [No Extremity Clubbing/Cyanosis] : no extremity clubbing/cyanosis [No Palpable Aorta] : no palpable aorta [Soft] : abdomen soft [Non Tender] : non-tender [Non-distended] : non-distended [No Masses] : no abdominal mass palpated [No HSM] : no HSM [Normal Bowel Sounds] : normal bowel sounds [Normal Supraclavicular Nodes] : no supraclavicular lymphadenopathy [Normal Posterior Cervical Nodes] : no posterior cervical lymphadenopathy [Normal Anterior Cervical Nodes] : no anterior cervical lymphadenopathy [No CVA Tenderness] : no CVA  tenderness [No Spinal Tenderness] : no spinal tenderness [No Joint Swelling] : no joint swelling [Grossly Normal Strength/Tone] : grossly normal strength/tone [No Rash] : no rash [Normal Gait] : normal gait [Coordination Grossly Intact] : coordination grossly intact [No Focal Deficits] : no focal deficits [Normal Affect] : the affect was normal [Alert and Oriented x3] : oriented to person, place, and time [Normal Insight/Judgement] : insight and judgment were intact [Comprehensive Foot Exam Normal] : Right and left foot were examined and both feet are normal. No ulcers in either foot. Toes are normal and with full ROM.  Normal tactile sensation with monofilament testing throughout both feet [de-identified] : IOL bilaleral [de-identified] :  Hearing aides [de-identified] : Fine basilar rales  fibrosis?  SOme clearance with cough.   [de-identified] : Bronze from solar exposure on chst and legs

## 2018-10-25 NOTE — REVIEW OF SYSTEMS
[Joint Stiffness] : joint stiffness [Chest Pain] : no chest pain [Shortness Of Breath] : no shortness of breath [Headache] : no headache [Dizziness] : no dizziness [FreeTextEntry2] : see HPI [FreeTextEntry9] : see HPI [de-identified] : Occas anxious

## 2018-10-25 NOTE — ASSESSMENT
[FreeTextEntry1] : 89 with COPD and possible fibrosis and hx of recurrent hosp admissions for resp insuff and sees Dr MARZENA Newberry\par Memory loss with agitation.  Ok on Aricept.  Dtr accepts to raise Zoloft 25 for evening agitation\par HCVD ok\par GERD pt feels PPI is necessary qd\par s/p IOL bilat\par Using walker at home only.  Advised Leah to consider outside walker use and she said she will.\par Independent in house with all personal care including bathing in tub with minor help to wash back.  Both dtrs have schedule to be with her.   \par Finger tingling is a long standing problem getting Acupuncture for R CTS\par Patient has "all of her own teeth" and eats well. \par

## 2019-01-14 ENCOUNTER — FORM ENCOUNTER (OUTPATIENT)
Age: 84
End: 2019-01-14

## 2019-01-15 ENCOUNTER — OUTPATIENT (OUTPATIENT)
Dept: OUTPATIENT SERVICES | Facility: HOSPITAL | Age: 84
LOS: 1 days | End: 2019-01-15
Payer: MEDICARE

## 2019-01-15 ENCOUNTER — APPOINTMENT (OUTPATIENT)
Dept: RADIOLOGY | Facility: IMAGING CENTER | Age: 84
End: 2019-01-15
Payer: MEDICARE

## 2019-01-15 ENCOUNTER — APPOINTMENT (OUTPATIENT)
Dept: INTERNAL MEDICINE | Facility: CLINIC | Age: 84
End: 2019-01-15
Payer: MEDICARE

## 2019-01-15 VITALS
SYSTOLIC BLOOD PRESSURE: 120 MMHG | RESPIRATION RATE: 12 BRPM | DIASTOLIC BLOOD PRESSURE: 56 MMHG | HEART RATE: 70 BPM | TEMPERATURE: 98.2 F

## 2019-01-15 VITALS — BODY MASS INDEX: 21.79 KG/M2 | HEIGHT: 60 IN | WEIGHT: 111 LBS

## 2019-01-15 DIAGNOSIS — J18.1 LOBAR PNEUMONIA, UNSPECIFIED ORGANISM: ICD-10-CM

## 2019-01-15 PROCEDURE — 71046 X-RAY EXAM CHEST 2 VIEWS: CPT

## 2019-01-15 PROCEDURE — 99214 OFFICE O/P EST MOD 30 MIN: CPT

## 2019-01-15 PROCEDURE — 71046 X-RAY EXAM CHEST 2 VIEWS: CPT | Mod: 26

## 2019-01-15 RX ORDER — SERTRALINE 25 MG/1
25 TABLET, FILM COATED ORAL
Qty: 90 | Refills: 0 | Status: COMPLETED | COMMUNITY
Start: 2018-01-22

## 2019-01-15 NOTE — PHYSICAL EXAM
[No Acute Distress] : no acute distress [Well Nourished] : well nourished [Well Developed] : well developed [Well-Appearing] : well-appearing [Normal Voice/Communication] : normal voice/communication [Normal Oropharynx] : the oropharynx was normal [Supple] : supple [No Lymphadenopathy] : no lymphadenopathy [No Respiratory Distress] : no respiratory distress  [No Accessory Muscle Use] : no accessory muscle use [Normal Rate] : normal rate  [Regular Rhythm] : with a regular rhythm [No Edema] : there was no peripheral edema [Soft] : abdomen soft [Normal Supraclavicular Nodes] : no supraclavicular lymphadenopathy [Normal Posterior Cervical Nodes] : no posterior cervical lymphadenopathy [Normal Anterior Cervical Nodes] : no anterior cervical lymphadenopathy [No Focal Deficits] : no focal deficits [Normal Affect] : the affect was normal [Alert and Oriented x3] : oriented to person, place, and time [de-identified] : Nasal mucosa swollen with erythema [de-identified] : LLL crackles diffuse with poor BS ? dullness  R lung clear with good BS

## 2019-01-15 NOTE — HISTORY OF PRESENT ILLNESS
[Other: _____] : [unfilled] [FreeTextEntry1] : Weak the last several days with a cough.  Better today.  Dtr concerned she has "walking PNA"  Some yellow sputum.  No fever chills.  \par Eval of OMS HTN chol GERD and needs PPI qd. \par

## 2019-01-15 NOTE — HEALTH RISK ASSESSMENT
[No falls in past year] : Patient reported no falls in the past year [0] : 2) Feeling down, depressed, or hopeless: Not at all (0) [] : No [[id number]] : 0

## 2019-01-15 NOTE — REVIEW OF SYSTEMS
[Cough] : cough [Negative] : Musculoskeletal [Chest Pain] : no chest pain [Shortness Of Breath] : no shortness of breath [Wheezing] : no wheezing [FreeTextEntry2] : see HPI [FreeTextEntry4] : see HPI [FreeTextEntry6] : see HPI

## 2019-01-15 NOTE — ASSESSMENT
[FreeTextEntry1] : Acute LLL PNA and acute sinusitis.  CXR and Amoxicillin 50 tid # 21 ordered  .  May use Robitussin and Mucinex.  \par HCVD CRF OMS all stable.

## 2019-01-16 ENCOUNTER — RESULT REVIEW (OUTPATIENT)
Age: 84
End: 2019-01-16

## 2019-02-25 ENCOUNTER — RX RENEWAL (OUTPATIENT)
Age: 84
End: 2019-02-25

## 2019-03-11 NOTE — DISCHARGE NOTE ADULT - PRINCIPAL DIAGNOSIS
Needs appointment rescheduled for [facility]  She was told by their office she needs to have her PCP reschedule   Bronchiectasis with acute exacerbation

## 2019-03-21 ENCOUNTER — APPOINTMENT (OUTPATIENT)
Dept: INTERNAL MEDICINE | Facility: CLINIC | Age: 84
End: 2019-03-21
Payer: MEDICARE

## 2019-03-21 VITALS — WEIGHT: 113 LBS | BODY MASS INDEX: 22.19 KG/M2 | HEIGHT: 60 IN

## 2019-03-21 VITALS — DIASTOLIC BLOOD PRESSURE: 64 MMHG | HEART RATE: 70 BPM | SYSTOLIC BLOOD PRESSURE: 110 MMHG | RESPIRATION RATE: 12 BRPM

## 2019-03-21 DIAGNOSIS — J98.11 ATELECTASIS: ICD-10-CM

## 2019-03-21 PROCEDURE — 36415 COLL VENOUS BLD VENIPUNCTURE: CPT

## 2019-03-21 PROCEDURE — 99214 OFFICE O/P EST MOD 30 MIN: CPT | Mod: 25

## 2019-03-21 RX ORDER — AMOXICILLIN 500 MG/1
500 TABLET, FILM COATED ORAL 3 TIMES DAILY
Qty: 21 | Refills: 0 | Status: DISCONTINUED | COMMUNITY
Start: 2019-01-15 | End: 2019-03-21

## 2019-03-21 NOTE — HEALTH RISK ASSESSMENT
[Any fall with injury in past year] : Patient reported fall with injury in the past year [0] : 2) Feeling down, depressed, or hopeless: Not at all (0) [] : No [de-identified] : Vickie pinedo [de-identified] : Fell OOB last week no injury. 2 daughters make sure she stays in the middle of the sharan size bed.   [[id number]] : 0

## 2019-03-21 NOTE — ASSESSMENT
[FreeTextEntry1] : COPD with atelectasis.  Advise in creased walking and deep respir.  Dtr has incentive spirometer and will take out. \par HCVD CRF OMS all stable.  Fell OOB from sharan size recently no injury.  Needs 2 dtrs to put her in and out.  THey provide shower and dressing assistance and cueing with RW.  She self feeds and is [employer] & B continent.  2 dtrs provide 24 hr care and rotate.  1 dtr has physical limitations.  Pt already has life alert.  Pt can benefit from a HHA.  Refer to [facility].

## 2019-03-21 NOTE — HISTORY OF PRESENT ILLNESS
[Other: _____] : [unfilled] [FreeTextEntry1] :  \par Eval of OMS HTN chol GERD and needs PPI qd. \par Saw Dr Golden 1-19 for exacerb COPD with neg CXR.  Got Pred and got better.

## 2019-03-21 NOTE — PHYSICAL EXAM
[No Acute Distress] : no acute distress [Well Nourished] : well nourished [Well Developed] : well developed [Well-Appearing] : well-appearing [Normal Voice/Communication] : normal voice/communication [Normal Oropharynx] : the oropharynx was normal [Supple] : supple [No Lymphadenopathy] : no lymphadenopathy [No Respiratory Distress] : no respiratory distress  [No Accessory Muscle Use] : no accessory muscle use [Normal Rate] : normal rate  [Regular Rhythm] : with a regular rhythm [No Edema] : there was no peripheral edema [Soft] : abdomen soft [Normal Supraclavicular Nodes] : no supraclavicular lymphadenopathy [Normal Posterior Cervical Nodes] : no posterior cervical lymphadenopathy [Normal Anterior Cervical Nodes] : no anterior cervical lymphadenopathy [No Focal Deficits] : no focal deficits [Normal Affect] : the affect was normal [Alert and Oriented x3] : oriented to person, place, and time [de-identified] :  Basilar rhonchi that change with deep respir and cough

## 2019-03-22 DIAGNOSIS — Z63.4 DISAPPEARANCE AND DEATH OF FAMILY MEMBER: ICD-10-CM

## 2019-03-22 LAB
ALBUMIN SERPL ELPH-MCNC: 4.5 G/DL
ALP BLD-CCNC: 116 U/L
ALT SERPL-CCNC: 11 U/L
ANION GAP SERPL CALC-SCNC: 16 MMOL/L
AST SERPL-CCNC: 14 U/L
BASOPHILS # BLD AUTO: 0.04 K/UL
BASOPHILS NFR BLD AUTO: 0.4 %
BILIRUB SERPL-MCNC: <0.2 MG/DL
BUN SERPL-MCNC: 30 MG/DL
CALCIUM SERPL-MCNC: 10.8 MG/DL
CHLORIDE SERPL-SCNC: 98 MMOL/L
CHOLEST SERPL-MCNC: 225 MG/DL
CHOLEST/HDLC SERPL: 4.2 RATIO
CO2 SERPL-SCNC: 25 MMOL/L
CREAT SERPL-MCNC: 1.22 MG/DL
EOSINOPHIL # BLD AUTO: 0.19 K/UL
EOSINOPHIL NFR BLD AUTO: 2 %
GLUCOSE SERPL-MCNC: 162 MG/DL
HBA1C MFR BLD HPLC: 6 %
HCT VFR BLD CALC: 37.1 %
HDLC SERPL-MCNC: 54 MG/DL
HGB BLD-MCNC: 11.2 G/DL
IMM GRANULOCYTES NFR BLD AUTO: 0.3 %
LDLC SERPL CALC-MCNC: 102 MG/DL
LYMPHOCYTES # BLD AUTO: 2.58 K/UL
LYMPHOCYTES NFR BLD AUTO: 26.5 %
MAN DIFF?: NORMAL
MCHC RBC-ENTMCNC: 26.7 PG
MCHC RBC-ENTMCNC: 30.2 GM/DL
MCV RBC AUTO: 88.3 FL
MONOCYTES # BLD AUTO: 0.83 K/UL
MONOCYTES NFR BLD AUTO: 8.5 %
NEUTROPHILS # BLD AUTO: 6.06 K/UL
NEUTROPHILS NFR BLD AUTO: 62.3 %
PLATELET # BLD AUTO: 363 K/UL
POTASSIUM SERPL-SCNC: 3.8 MMOL/L
PROT SERPL-MCNC: 7.4 G/DL
RBC # BLD: 4.2 M/UL
RBC # FLD: 15.1 %
SODIUM SERPL-SCNC: 139 MMOL/L
TRIGL SERPL-MCNC: 346 MG/DL
TSH SERPL-ACNC: 1.49 UIU/ML
WBC # FLD AUTO: 9.73 K/UL

## 2019-03-22 SDOH — SOCIAL STABILITY - SOCIAL INSECURITY: DISSAPEARANCE AND DEATH OF FAMILY MEMBER: Z63.4

## 2019-03-26 ENCOUNTER — RX RENEWAL (OUTPATIENT)
Age: 84
End: 2019-03-26

## 2019-04-20 ENCOUNTER — RX RENEWAL (OUTPATIENT)
Age: 84
End: 2019-04-20

## 2019-05-10 ENCOUNTER — APPOINTMENT (OUTPATIENT)
Dept: INTERNAL MEDICINE | Facility: CLINIC | Age: 84
End: 2019-05-10

## 2019-05-11 ENCOUNTER — TRANSCRIPTION ENCOUNTER (OUTPATIENT)
Age: 84
End: 2019-05-11

## 2019-06-20 ENCOUNTER — APPOINTMENT (OUTPATIENT)
Dept: INTERNAL MEDICINE | Facility: CLINIC | Age: 84
End: 2019-06-20

## 2019-06-27 ENCOUNTER — RX RENEWAL (OUTPATIENT)
Age: 84
End: 2019-06-27

## 2019-07-11 ENCOUNTER — APPOINTMENT (OUTPATIENT)
Dept: INTERNAL MEDICINE | Facility: CLINIC | Age: 84
End: 2019-07-11

## 2019-08-11 ENCOUNTER — RX RENEWAL (OUTPATIENT)
Age: 84
End: 2019-08-11

## 2019-09-05 ENCOUNTER — APPOINTMENT (OUTPATIENT)
Dept: INTERNAL MEDICINE | Facility: CLINIC | Age: 84
End: 2019-09-05
Payer: MEDICARE

## 2019-09-05 VITALS — RESPIRATION RATE: 12 BRPM | DIASTOLIC BLOOD PRESSURE: 60 MMHG | SYSTOLIC BLOOD PRESSURE: 120 MMHG | HEART RATE: 80 BPM

## 2019-09-05 VITALS — HEIGHT: 60 IN | WEIGHT: 103 LBS | BODY MASS INDEX: 20.22 KG/M2

## 2019-09-05 DIAGNOSIS — F32.9 MAJOR DEPRESSIVE DISORDER, SINGLE EPISODE, UNSPECIFIED: ICD-10-CM

## 2019-09-05 DIAGNOSIS — K29.90 GASTRODUODENITIS, UNSPECIFIED, W/OUT BLEEDING: ICD-10-CM

## 2019-09-05 PROCEDURE — 99214 OFFICE O/P EST MOD 30 MIN: CPT

## 2019-09-05 RX ORDER — AZITHROMYCIN 250 MG/1
250 TABLET, FILM COATED ORAL
Qty: 6 | Refills: 0 | Status: COMPLETED | COMMUNITY
Start: 2019-05-15

## 2019-09-05 RX ORDER — AMOXICILLIN AND CLAVULANATE POTASSIUM 875; 125 MG/1; MG/1
875-125 TABLET, COATED ORAL
Qty: 20 | Refills: 0 | Status: COMPLETED | COMMUNITY
Start: 2019-05-11

## 2019-09-05 RX ORDER — PREDNISONE 5 MG/1
5 TABLET ORAL
Qty: 35 | Refills: 0 | Status: COMPLETED | COMMUNITY
Start: 2019-05-15

## 2019-09-05 NOTE — HEALTH RISK ASSESSMENT
[Never (0 pts)] : Never (0 points) [No] : In the past 12 months have you used drugs other than those required for medical reasons? No [No falls in past year] : Patient reported no falls in the past year [2] : 1) Little interest or pleasure doing things for more than half of the days (2) [] : No [Audit-CScore] : 0 [[id number]] : 4

## 2019-09-05 NOTE — PHYSICAL EXAM
[No Acute Distress] : no acute distress [Well Nourished] : well nourished [Well Developed] : well developed [Well-Appearing] : well-appearing [Normal Outer Ear/Nose] : the outer ears and nose were normal in appearance [No Lymphadenopathy] : no lymphadenopathy [Thyroid Normal, No Nodules] : the thyroid was normal and there were no nodules present [Supple] : supple [Clear to Auscultation] : lungs were clear to auscultation bilaterally [Regular Rhythm] : with a regular rhythm [Pedal Pulses Present] : the pedal pulses are present [No Edema] : there was no peripheral edema [No Carotid Bruits] : no carotid bruits [Non Tender] : non-tender [Soft] : abdomen soft [Non-distended] : non-distended [No Masses] : no abdominal mass palpated [Normal Bowel Sounds] : normal bowel sounds [Normal Posterior Cervical Nodes] : no posterior cervical lymphadenopathy [Normal Supraclavicular Nodes] : no supraclavicular lymphadenopathy [No CVA Tenderness] : no CVA  tenderness [Normal Anterior Cervical Nodes] : no anterior cervical lymphadenopathy [Speech Grossly Normal] : speech grossly normal [No Focal Deficits] : no focal deficits [Normal Affect] : the affect was normal [Memory Grossly Normal] : memory grossly normal [Alert and Oriented x3] : oriented to person, place, and time [Normal Mood] : the mood was normal [Normal Insight/Judgement] : insight and judgment were intact [de-identified] : Fully oriented and comfortable with slight grunting

## 2019-09-05 NOTE — REVIEW OF SYSTEMS
[Nausea] : nausea [Anxiety] : anxiety [Depression] : depression [Negative] : Neurological [Constipation] : no constipation [Melena] : no melena [Diarrhea] : diarrhea [FreeTextEntry2] : See HPI [FreeTextEntry9] : Gait unsteady due to arthritis pain and gets Tylenol arthritis

## 2019-09-05 NOTE — HISTORY OF PRESENT ILLNESS
[Other: _____] : [unfilled] [FreeTextEntry1] :  \par Eval of OMS HTN chol GERD and needs PPI qd. \par Saw Dr Golden last wk for  COPD  He noted increased depression mild wt loss and less appetite as does the dtr and rec we consider depression Tx.  ALready on Zoloft.  Note the other dtr raised Zoloft to 100 for a while but pt was less steady on walker and less appetite and increased anxiety with hand shaking.\par Came today with c/o nausea since earlier.  Had cheerios and had small BM.  Placed in WC.  \par Appears well with hair and nails done and comfortable.  \par

## 2019-09-05 NOTE — ASSESSMENT
[FreeTextEntry1] : COPD with atelectasis.  Saw Dr Golden this week. Rec add'l mgt of depression anxiety\par HCVD CRF OMS all stable. \par Immobility due to age and OA pain.  Cont Tylenol Arthritis prn.   .  Needs 2 dtrs to put her in and out of bed.  THey provide shower and dressing assistance and cueing with RW.  She self feeds and is B & [employer] continent.  2 dtrs provide 24 hr care and rotate.  1 dtr has physical limitations.  Pt already has life alert. \par Nausea since earlier today with soft NT abd.  Had small meal and small usual BM\par Increased anxiety and depression.  Had unsteady gait and increased anxiety on Zoloft 100.  Now back on Zoloft 50.  Wt loss and decreased appetite noted.  WIll add Remeron 7.5.  Suggest ensure.\par Unsteady gait  Has walker at home.  Dtr advised to consider travelling chair or Rollator.

## 2019-09-08 ENCOUNTER — RX RENEWAL (OUTPATIENT)
Age: 84
End: 2019-09-08

## 2019-10-24 ENCOUNTER — APPOINTMENT (OUTPATIENT)
Dept: INTERNAL MEDICINE | Facility: CLINIC | Age: 84
End: 2019-10-24
Payer: MEDICARE

## 2019-10-24 VITALS — DIASTOLIC BLOOD PRESSURE: 68 MMHG | SYSTOLIC BLOOD PRESSURE: 124 MMHG | HEART RATE: 78 BPM | TEMPERATURE: 97.7 F

## 2019-10-24 VITALS — HEIGHT: 60 IN | WEIGHT: 113 LBS | BODY MASS INDEX: 22.19 KG/M2

## 2019-10-24 DIAGNOSIS — Z87.09 PERSONAL HISTORY OF OTHER DISEASES OF THE RESPIRATORY SYSTEM: ICD-10-CM

## 2019-10-24 DIAGNOSIS — B96.89 PERSONAL HISTORY OF OTHER DISEASES OF THE RESPIRATORY SYSTEM: ICD-10-CM

## 2019-10-24 PROCEDURE — 99214 OFFICE O/P EST MOD 30 MIN: CPT

## 2019-10-24 RX ORDER — POTASSIUM CHLORIDE 600 MG/1
8 TABLET, FILM COATED, EXTENDED RELEASE ORAL
Qty: 90 | Refills: 3 | Status: ACTIVE | COMMUNITY
Start: 2017-07-17 | End: 1900-01-01

## 2019-10-24 RX ORDER — DONEPEZIL HYDROCHLORIDE 5 MG/1
5 TABLET ORAL
Qty: 90 | Refills: 3 | Status: ACTIVE | COMMUNITY
Start: 2018-01-22 | End: 1900-01-01

## 2019-10-24 RX ORDER — SERTRALINE HYDROCHLORIDE 50 MG/1
50 TABLET, FILM COATED ORAL
Qty: 90 | Refills: 3 | Status: ACTIVE | COMMUNITY
Start: 2017-07-26 | End: 1900-01-01

## 2019-10-24 RX ORDER — OMEPRAZOLE 20 MG/1
20 CAPSULE, DELAYED RELEASE ORAL
Qty: 90 | Refills: 3 | Status: ACTIVE | COMMUNITY
Start: 2017-08-19 | End: 1900-01-01

## 2019-10-24 RX ORDER — MIRTAZAPINE 15 MG/1
15 TABLET, FILM COATED ORAL
Qty: 90 | Refills: 3 | Status: ACTIVE | COMMUNITY
Start: 2019-09-05 | End: 1900-01-01

## 2019-10-24 RX ORDER — HYDROCHLOROTHIAZIDE 25 MG/1
25 TABLET ORAL DAILY
Qty: 90 | Refills: 3 | Status: ACTIVE | COMMUNITY
Start: 2019-08-11 | End: 1900-01-01

## 2019-10-24 RX ORDER — SIMVASTATIN 20 MG/1
20 TABLET, FILM COATED ORAL DAILY
Qty: 90 | Refills: 3 | Status: ACTIVE | COMMUNITY
Start: 2017-10-29 | End: 1900-01-01

## 2019-10-24 RX ORDER — AMLODIPINE BESYLATE 5 MG/1
5 TABLET ORAL
Qty: 90 | Refills: 3 | Status: ACTIVE | COMMUNITY
Start: 2017-08-19 | End: 1900-01-01

## 2019-10-24 NOTE — PHYSICAL EXAM
[No Acute Distress] : no acute distress [Well Nourished] : well nourished [Well Developed] : well developed [Normal Outer Ear/Nose] : the outer ears and nose were normal in appearance [Well-Appearing] : well-appearing [No Lymphadenopathy] : no lymphadenopathy [Supple] : supple [Thyroid Normal, No Nodules] : the thyroid was normal and there were no nodules present [No Carotid Bruits] : no carotid bruits [Regular Rhythm] : with a regular rhythm [Pedal Pulses Present] : the pedal pulses are present [No Edema] : there was no peripheral edema [Non Tender] : non-tender [Soft] : abdomen soft [Non-distended] : non-distended [No Masses] : no abdominal mass palpated [Normal Bowel Sounds] : normal bowel sounds [Normal Supraclavicular Nodes] : no supraclavicular lymphadenopathy [Normal Posterior Cervical Nodes] : no posterior cervical lymphadenopathy [Normal Anterior Cervical Nodes] : no anterior cervical lymphadenopathy [No CVA Tenderness] : no CVA  tenderness [Speech Grossly Normal] : speech grossly normal [No Focal Deficits] : no focal deficits [Memory Grossly Normal] : memory grossly normal [Normal Affect] : the affect was normal [Alert and Oriented x3] : oriented to person, place, and time [Normal Mood] : the mood was normal [Normal Insight/Judgement] : insight and judgment were intact [de-identified] : Fully oriented and comfortable   [de-identified] : Bilat rhonchi that change with cough and respir

## 2019-10-24 NOTE — HISTORY OF PRESENT ILLNESS
[FreeTextEntry8] : c/o cough x 2 days green sputum.  No SOB, wheeze, Has runny nose.  Sleeps OK.  No fever chills.

## 2019-10-24 NOTE — REVIEW OF SYSTEMS
[Cough] : cough [Anxiety] : anxiety [Depression] : depression [Negative] : Neurological [Shortness Of Breath] : no shortness of breath [Wheezing] : no wheezing [Constipation] : no constipation [Diarrhea] : diarrhea [Melena] : no melena [FreeTextEntry2] : See HPI [FreeTextEntry9] : Gait unsteady due to arthritis pain and gets Tylenol arthritis

## 2019-10-24 NOTE — HEALTH RISK ASSESSMENT
[No falls in past year] : Patient reported no falls in the past year [No] : In the past 12 months have you used drugs other than those required for medical reasons? No [0] : 1) Little interest or pleasure doing things: Not at all (0) [] : No [Audit-CScore] : 0 [[id number]] : 0

## 2019-10-24 NOTE — ASSESSMENT
[FreeTextEntry1] : COPD with hx of atelectasis.  Now with  Sees Dr Chantel AGUERO to have ABX.  Has neb at home  No indic for Pred\par HCVD CRF OMS all stable. \par Immobility due to age and OA pain.  Cont Tylenol Arthritis prn.   .  Needs 2 dtrs to put her in and out of bed.  THey provide shower and dressing assistance and cueing with RW.  She self feeds and is [employer] continent.  2 dtrs provide 24 hr care and rotate.  1 dtr has physical limitations.  Pt already has life alert. \par Unsteady gait  Has walker at home.  Dtr advised to consider travelling chair or Rollator.  \par Chol 225 .  Takes Simvastatin in AM. Consider Atorvastatin to improve result.

## 2019-11-14 ENCOUNTER — APPOINTMENT (OUTPATIENT)
Dept: INTERNAL MEDICINE | Facility: CLINIC | Age: 84
End: 2019-11-14
Payer: MEDICARE

## 2019-11-14 VITALS — WEIGHT: 113 LBS | BODY MASS INDEX: 22.19 KG/M2 | HEIGHT: 60 IN

## 2019-11-14 VITALS — SYSTOLIC BLOOD PRESSURE: 128 MMHG | HEART RATE: 78 BPM | DIASTOLIC BLOOD PRESSURE: 56 MMHG | RESPIRATION RATE: 14 BRPM

## 2019-11-14 DIAGNOSIS — Z23 ENCOUNTER FOR IMMUNIZATION: ICD-10-CM

## 2019-11-14 DIAGNOSIS — R41.3 OTHER AMNESIA: ICD-10-CM

## 2019-11-14 DIAGNOSIS — E78.00 PURE HYPERCHOLESTEROLEMIA, UNSPECIFIED: ICD-10-CM

## 2019-11-14 PROCEDURE — G0009: CPT

## 2019-11-14 PROCEDURE — 99214 OFFICE O/P EST MOD 30 MIN: CPT | Mod: 25

## 2019-11-14 PROCEDURE — 90662 IIV NO PRSV INCREASED AG IM: CPT

## 2019-11-14 PROCEDURE — G0008: CPT | Mod: LT

## 2019-11-14 PROCEDURE — 90670 PCV13 VACCINE IM: CPT

## 2019-11-14 RX ORDER — AMOXICILLIN 500 MG/1
500 CAPSULE ORAL 3 TIMES DAILY
Qty: 21 | Refills: 0 | Status: DISCONTINUED | COMMUNITY
Start: 2017-07-12 | End: 2019-11-14

## 2019-11-14 NOTE — ASSESSMENT
[FreeTextEntry1] : COPD with hx of atelectasis.   Sees Dr Golden Improved after Amoxicillin and neb at home   Advised increased walking since poor candidate for incentive spirometer. \par HCVD CRF OMS all stable. \par Immobility due to age and OA pain.  Cont Tylenol Arthritis prn.   .  Needs 2 dtrs to put her in and out of bed.  THey provide shower and dressing assistance and cueing with RW.  She self feeds and is [place] & [employer] continent.  2 dtrs provide 24 hr care and rotate.  1 dtr has physical limitations.  Pt already has life alert. \par Unsteady gait  Has walker at home.   \par Chol 225 .  Takes Simvastatin in AM. Consider Atorvastatin to improve result.  \par Cerumen lg mass removed L ear   R is clear.

## 2019-11-14 NOTE — PHYSICAL EXAM
[Well Nourished] : well nourished [No Acute Distress] : no acute distress [Well Developed] : well developed [No Lymphadenopathy] : no lymphadenopathy [Normal Outer Ear/Nose] : the outer ears and nose were normal in appearance [Well-Appearing] : well-appearing [Supple] : supple [Thyroid Normal, No Nodules] : the thyroid was normal and there were no nodules present [No Carotid Bruits] : no carotid bruits [Regular Rhythm] : with a regular rhythm [Pedal Pulses Present] : the pedal pulses are present [No Edema] : there was no peripheral edema [Soft] : abdomen soft [No Masses] : no abdominal mass palpated [Non Tender] : non-tender [Non-distended] : non-distended [Normal Bowel Sounds] : normal bowel sounds [Normal Supraclavicular Nodes] : no supraclavicular lymphadenopathy [Normal Posterior Cervical Nodes] : no posterior cervical lymphadenopathy [Normal Anterior Cervical Nodes] : no anterior cervical lymphadenopathy [No CVA Tenderness] : no CVA  tenderness [No Focal Deficits] : no focal deficits [Memory Grossly Normal] : memory grossly normal [Speech Grossly Normal] : speech grossly normal [Alert and Oriented x3] : oriented to person, place, and time [Normal Mood] : the mood was normal [Normal Affect] : the affect was normal [Normal Insight/Judgement] : insight and judgment were intact [Normal TMs] : both tympanic membranes were normal [de-identified] : Fully oriented and comfortable   [de-identified] : Lg mass of cerumen removed with loop [de-identified] :  LISETTE WALLACE is ok

## 2019-11-14 NOTE — HEALTH RISK ASSESSMENT
[No] : In the past 12 months have you used drugs other than those required for medical reasons? No [(PHQ-2) Unable to screen] : PHQ-2: unable to screen [No falls in past year] : Patient reported no falls in the past year [] : No [UnableToScreenReason] : On Zoloft and Remeron with improvement

## 2019-11-14 NOTE — REVIEW OF SYSTEMS
[Anxiety] : anxiety [Depression] : depression [Negative] : Neurological [Shortness Of Breath] : no shortness of breath [Cough] : no cough [Wheezing] : no wheezing [Diarrhea] : diarrhea [Melena] : no melena [Constipation] : no constipation [FreeTextEntry2] : See HPI [FreeTextEntry9] : Gait unsteady due to arthritis pain and gets Tylenol arthritis [de-identified] : Stable behavior

## 2020-02-07 ENCOUNTER — APPOINTMENT (OUTPATIENT)
Dept: INTERNAL MEDICINE | Facility: CLINIC | Age: 85
End: 2020-02-07
Payer: MEDICARE

## 2020-02-07 VITALS
TEMPERATURE: 98.3 F | DIASTOLIC BLOOD PRESSURE: 56 MMHG | HEART RATE: 72 BPM | RESPIRATION RATE: 14 BRPM | SYSTOLIC BLOOD PRESSURE: 106 MMHG

## 2020-02-07 VITALS — HEIGHT: 60 IN | BODY MASS INDEX: 22.78 KG/M2 | WEIGHT: 116 LBS

## 2020-02-07 DIAGNOSIS — I11.9 HYPERTENSIVE HEART DISEASE W/OUT HEART FAILURE: ICD-10-CM

## 2020-02-07 DIAGNOSIS — N18.3 CHRONIC KIDNEY DISEASE, STAGE 3 (MODERATE): ICD-10-CM

## 2020-02-07 DIAGNOSIS — J44.9 CHRONIC OBSTRUCTIVE PULMONARY DISEASE, UNSPECIFIED: ICD-10-CM

## 2020-02-07 DIAGNOSIS — J00 ACUTE NASOPHARYNGITIS [COMMON COLD]: ICD-10-CM

## 2020-02-07 DIAGNOSIS — Z78.9 OTHER SPECIFIED HEALTH STATUS: ICD-10-CM

## 2020-02-07 PROCEDURE — 99214 OFFICE O/P EST MOD 30 MIN: CPT

## 2020-02-07 RX ORDER — OFLOXACIN 3 MG/ML
0.3 SOLUTION/ DROPS OPHTHALMIC
Qty: 10 | Refills: 0 | Status: COMPLETED | COMMUNITY
Start: 2019-09-26

## 2020-02-07 RX ORDER — POLYMYXIN B SULFATE AND TRIMETHOPRIM 10000; 1 [USP'U]/ML; MG/ML
10000-0.1 SOLUTION OPHTHALMIC
Qty: 10 | Refills: 0 | Status: COMPLETED | COMMUNITY
Start: 2019-09-16

## 2020-02-07 RX ORDER — CEFUROXIME AXETIL 250 MG/1
250 TABLET ORAL
Qty: 14 | Refills: 0 | Status: COMPLETED | COMMUNITY
Start: 2020-01-06

## 2020-02-07 NOTE — REVIEW OF SYSTEMS
[Hoarseness] : no hoarseness [Hearing Loss] : hearing loss [Postnasal Drip] : postnasal drip [Sore Throat] : no sore throat [Nasal Discharge] : no nasal discharge [Chest Pain] : no chest pain [Shortness Of Breath] : no shortness of breath [Lower Ext Edema] : no lower extremity edema [Confusion] : confusion [Wheezing] : no wheezing [Cough] : cough [Negative] : Musculoskeletal [FreeTextEntry2] : see HPI

## 2020-02-07 NOTE — HISTORY OF PRESENT ILLNESS
[FreeTextEntry8] : USOH till this AM.  T 100.0.  slight dry cough.  SOB slight.  Runny nose is main complaint.  Wearing mask.  Weakness of activity and movement.  Dtr says "disoriented"  Appears cooperative and appropriate.  Only had coffee today.

## 2020-02-07 NOTE — PHYSICAL EXAM
[Well Nourished] : well nourished [No Acute Distress] : no acute distress [Well-Appearing] : well-appearing [Well Developed] : well developed [Normal TMs] : both tympanic membranes were normal [Normal Oropharynx] : the oropharynx was normal [Normal Outer Ear/Nose] : the outer ears and nose were normal in appearance [No Lymphadenopathy] : no lymphadenopathy [Supple] : supple [Regular Rhythm] : with a regular rhythm [Thyroid Normal, No Nodules] : the thyroid was normal and there were no nodules present [Pedal Pulses Present] : the pedal pulses are present [No Carotid Bruits] : no carotid bruits [Soft] : abdomen soft [No Edema] : there was no peripheral edema [Non Tender] : non-tender [No Masses] : no abdominal mass palpated [Normal Bowel Sounds] : normal bowel sounds [Non-distended] : non-distended [Normal Posterior Cervical Nodes] : no posterior cervical lymphadenopathy [Normal Supraclavicular Nodes] : no supraclavicular lymphadenopathy [Normal Anterior Cervical Nodes] : no anterior cervical lymphadenopathy [No CVA Tenderness] : no CVA  tenderness [No Focal Deficits] : no focal deficits [Memory Grossly Normal] : memory grossly normal [Speech Grossly Normal] : speech grossly normal [Normal Affect] : the affect was normal [Normal Mood] : the mood was normal [Alert and Oriented x3] : oriented to person, place, and time [Normal Insight/Judgement] : insight and judgment were intact [de-identified] : Clear with good BS [de-identified] :   comfortable   [de-identified] : throat neg  Nose lg clear secretion

## 2020-02-07 NOTE — ASSESSMENT
[FreeTextEntry1] : Mild acute URI with clear nasal disch and no fever.  No bronchitis.  Note had ABX 1 mo ago per Dr Golden.  Suggest PO fluids rest and Tylenol\par Mild change in MS is expected.  \par \par 11-14-19\par COPD with hx of atelectasis.   Sees Dr Golden Improved after Amoxicillin and neb at home   Advised increased walking since poor candidate for incentive spirometer. \par HCVD CRF OMS all stable. \par Immobility due to age and OA pain.  Cont Tylenol Arthritis prn.   .  Needs 2 dtrs to put her in and out of bed.  THey provide shower and dressing assistance and cueing with RW.  She self feeds and is [employer] & [employer] continent.  2 dtrs provide 24 hr care and rotate.  1 dtr has physical limitations.  Pt already has life alert. \par Unsteady gait  Has walker at home.   \par Chol 225 .  Takes Simvastatin in AM. Consider Atorvastatin to improve result.  \par Cerumen lg mass removed L ear   R is clear.

## 2020-03-03 ENCOUNTER — APPOINTMENT (OUTPATIENT)
Dept: INTERNAL MEDICINE | Facility: CLINIC | Age: 85
End: 2020-03-03

## 2020-03-23 ENCOUNTER — TRANSCRIPTION ENCOUNTER (OUTPATIENT)
Age: 85
End: 2020-03-23

## 2020-05-15 ENCOUNTER — APPOINTMENT (OUTPATIENT)
Dept: INTERNAL MEDICINE | Facility: CLINIC | Age: 85
End: 2020-05-15

## 2020-12-21 PROBLEM — Z87.09 HISTORY OF ACUTE BACTERIAL SINUSITIS: Status: RESOLVED | Noted: 2019-01-15 | Resolved: 2020-12-21

## 2020-12-21 PROBLEM — Z87.09 HISTORY OF ACUTE BRONCHITIS: Status: RESOLVED | Noted: 2019-10-24 | Resolved: 2020-12-21

## 2021-07-18 NOTE — INPATIENT CERTIFICATION FOR MEDICARE PATIENTS - CURRENT MEDICAL NEEDS AND CARE PLANS
Pt presents to ED with 4 days of fever, chills, body aches, rash. Went to [facility] on Friday, tested for covid and strep. Denies cough or difficulty breathing. Denies urinary complaints. No meds taken today.    Possible Home

## 2021-08-29 NOTE — HISTORY OF PRESENT ILLNESS
[Other: _____] : [unfilled] [FreeTextEntry1] : F/up bronchitis and atelectasis\par Eval of OMS HTN chol GERD and needs PPI qd. \par Sees Dr Golden  for  COPD \par Appetite increased on Mirtazapine with wt gain. unk

## 2021-11-30 NOTE — PATIENT PROFILE ADULT. - NS PRO ABUSE SCREEN AFRAID ANYONE YN
Pt wife called and states pt was treated recently and the spot on upper right forehead is puffy and doesn't look good.       Please advise      [phone]       no

## 2022-09-22 NOTE — H&P ADULT - NSHPPOADEEPVENOUSTHROMB_GEN_A_CORE
Patient endorsed from prior shift, remains awake and alert. Pt states that she presented to the ED for abnormal stress test result. Currently denies CP/SOB, denies ha/dizziness.
no

## 2023-07-08 NOTE — ED PROVIDER NOTE - NS ED MD DISPO DIVISION
Mom Garth Rankin (not on HIPAA) indicated that patient saw Kasie Brigid on 6/16/2023 and he gave her a copy of the after visit summary. Mom concerned regarding the blood pressure. Advised mom that because she is not on HIPAA that can not go over any information regarding patient's visit that she would need to speak to patient. Otherwise went over general information on normal blood pressure readings.
[facility]
